# Patient Record
Sex: FEMALE | Race: WHITE | NOT HISPANIC OR LATINO | Employment: FULL TIME | URBAN - METROPOLITAN AREA
[De-identification: names, ages, dates, MRNs, and addresses within clinical notes are randomized per-mention and may not be internally consistent; named-entity substitution may affect disease eponyms.]

---

## 2023-09-07 PROBLEM — L30.4 INTERTRIGO: Status: ACTIVE | Noted: 2023-09-07

## 2023-09-07 PROBLEM — G56.00 CARPAL TUNNEL SYNDROME: Status: ACTIVE | Noted: 2023-09-07

## 2023-09-07 PROBLEM — E55.9 VITAMIN D DEFICIENCY: Status: ACTIVE | Noted: 2023-09-07

## 2023-09-07 PROBLEM — E11.65 HYPERGLYCEMIA DUE TO TYPE 2 DIABETES MELLITUS (MULTI): Status: ACTIVE | Noted: 2023-09-07

## 2023-09-07 PROBLEM — E66.9 OBESITY: Status: ACTIVE | Noted: 2023-09-07

## 2023-09-07 PROBLEM — K76.0 FATTY LIVER: Status: ACTIVE | Noted: 2023-09-07

## 2023-09-07 PROBLEM — I10 ESSENTIAL HYPERTENSION: Status: ACTIVE | Noted: 2023-09-07

## 2023-09-07 PROBLEM — K21.9 GASTROESOPHAGEAL REFLUX DISEASE WITHOUT ESOPHAGITIS: Status: ACTIVE | Noted: 2023-09-07

## 2023-09-07 PROBLEM — E03.9 HYPOTHYROIDISM: Status: ACTIVE | Noted: 2023-09-07

## 2023-09-07 PROBLEM — D50.9 IRON DEFICIENCY ANEMIA: Status: ACTIVE | Noted: 2023-09-07

## 2023-09-07 PROBLEM — M51.9 DISORDER OF INTERVERTEBRAL DISC OF LUMBAR SPINE: Status: ACTIVE | Noted: 2023-09-07

## 2023-09-07 PROBLEM — R32 URINARY INCONTINENCE: Status: ACTIVE | Noted: 2023-09-07

## 2023-09-07 PROBLEM — E78.2 MIXED HYPERLIPIDEMIA: Status: ACTIVE | Noted: 2023-09-07

## 2023-09-07 RX ORDER — TIRZEPATIDE 7.5 MG/.5ML
INJECTION, SOLUTION SUBCUTANEOUS
COMMUNITY
Start: 2023-01-19 | End: 2024-01-25

## 2023-09-07 RX ORDER — MULTIVIT-MIN/FA/LYCOPEN/LUTEIN .4-300-25
TABLET ORAL
COMMUNITY
End: 2024-03-28 | Stop reason: SDUPTHER

## 2023-09-07 RX ORDER — TIRZEPATIDE 5 MG/.5ML
INJECTION, SOLUTION SUBCUTANEOUS
COMMUNITY
Start: 2022-09-19 | End: 2024-01-25

## 2023-09-07 RX ORDER — PIOGLITAZONEHYDROCHLORIDE 15 MG/1
TABLET ORAL
COMMUNITY
End: 2024-01-25 | Stop reason: WASHOUT

## 2023-09-07 RX ORDER — CHOLECALCIFEROL (VITAMIN D3) 25 MCG
TABLET ORAL
COMMUNITY
End: 2024-03-28 | Stop reason: SDUPTHER

## 2023-09-07 RX ORDER — OMEPRAZOLE 20 MG/1
CAPSULE, DELAYED RELEASE ORAL
COMMUNITY
Start: 2020-02-28 | End: 2024-03-28 | Stop reason: SDUPTHER

## 2023-09-07 RX ORDER — OXYBUTYNIN CHLORIDE 10 MG/1
TABLET, EXTENDED RELEASE ORAL
COMMUNITY
End: 2024-02-07

## 2023-09-07 RX ORDER — METFORMIN HYDROCHLORIDE 1000 MG/1
TABLET ORAL
COMMUNITY
Start: 2020-02-10 | End: 2024-01-25 | Stop reason: SDUPTHER

## 2023-09-07 RX ORDER — NYSTATIN 100000 [USP'U]/G
POWDER TOPICAL
COMMUNITY
Start: 2020-02-17 | End: 2024-03-28 | Stop reason: ALTCHOICE

## 2023-09-07 RX ORDER — LANCETS
EACH MISCELLANEOUS
COMMUNITY
Start: 2010-08-17

## 2023-09-07 RX ORDER — GLIMEPIRIDE 2 MG/1
2 TABLET ORAL 2 TIMES DAILY
COMMUNITY
End: 2024-03-28 | Stop reason: SDUPTHER

## 2023-09-07 RX ORDER — METFORMIN HYDROCHLORIDE EXTENDED-RELEASE TABLETS 1000 MG/1
TABLET, FILM COATED, EXTENDED RELEASE ORAL
COMMUNITY
End: 2024-01-25

## 2023-09-07 RX ORDER — ATORVASTATIN CALCIUM 20 MG/1
TABLET, FILM COATED ORAL
COMMUNITY
Start: 2021-09-10 | End: 2024-02-07

## 2023-09-07 RX ORDER — LISINOPRIL 10 MG/1
TABLET ORAL
COMMUNITY
End: 2023-12-18

## 2023-09-20 ENCOUNTER — HOSPITAL ENCOUNTER (OUTPATIENT)
Dept: DATA CONVERSION | Facility: HOSPITAL | Age: 58
Discharge: HOME | End: 2023-09-20
Payer: MEDICARE

## 2023-09-20 DIAGNOSIS — E11.65 TYPE 2 DIABETES MELLITUS WITH HYPERGLYCEMIA (MULTI): ICD-10-CM

## 2023-09-20 DIAGNOSIS — E03.9 HYPOTHYROIDISM, UNSPECIFIED: ICD-10-CM

## 2023-09-20 DIAGNOSIS — Z01.89 ENCOUNTER FOR OTHER SPECIFIED SPECIAL EXAMINATIONS: ICD-10-CM

## 2023-09-20 DIAGNOSIS — E78.2 MIXED HYPERLIPIDEMIA: ICD-10-CM

## 2023-09-20 DIAGNOSIS — E55.9 VITAMIN D DEFICIENCY, UNSPECIFIED: ICD-10-CM

## 2023-09-20 DIAGNOSIS — D50.9 IRON DEFICIENCY ANEMIA, UNSPECIFIED: ICD-10-CM

## 2023-09-20 LAB
25(OH)D3 SERPL-MCNC: 36 NG/ML (ref 31–100)
ALBUMIN SERPL-MCNC: 4.5 GM/DL (ref 3.5–5)
ALBUMIN/GLOB SERPL: 1.8 RATIO (ref 1.5–3)
ALP BLD-CCNC: 82 U/L (ref 35–125)
ALT SERPL-CCNC: 18 U/L (ref 5–40)
ANION GAP SERPL CALCULATED.3IONS-SCNC: 15 MMOL/L (ref 0–19)
APPEARANCE PLAS: ABNORMAL
AST SERPL-CCNC: 18 U/L (ref 5–40)
BASOPHILS # BLD AUTO: 0.04 K/UL (ref 0–0.22)
BASOPHILS NFR BLD AUTO: 0.4 % (ref 0–1)
BILIRUB DIRECT SERPL-MCNC: 0.2 MG/DL (ref 0–0.2)
BILIRUB INDIRECT SERPL-MCNC: 0 MG/DL (ref 0–0.8)
BILIRUB SERPL-MCNC: 0.2 MG/DL (ref 0.1–1.2)
BUN SERPL-MCNC: 9 MG/DL (ref 8–25)
BUN/CREAT SERPL: 15 RATIO (ref 8–21)
CALCIUM SERPL-MCNC: 9.5 MG/DL (ref 8.5–10.4)
CHLORIDE SERPL-SCNC: 104 MMOL/L (ref 97–107)
CHOLEST SERPL-MCNC: 138 MG/DL (ref 133–200)
CHOLEST/HDLC SERPL: 2.5 RATIO
CO2 SERPL-SCNC: 22 MMOL/L (ref 24–31)
COLOR SPUN FLD: ABNORMAL
CREAT SERPL-MCNC: 0.6 MG/DL (ref 0.4–1.6)
CREAT UR-MCNC: 34.9 MG/DL
DEPRECATED RDW RBC AUTO: 46.2 FL (ref 37–54)
DIFFERENTIAL METHOD BLD: ABNORMAL
EOSINOPHIL # BLD AUTO: 0.61 K/UL (ref 0–0.45)
EOSINOPHIL NFR BLD: 6.3 % (ref 0–3)
ERYTHROCYTE [DISTWIDTH] IN BLOOD BY AUTOMATED COUNT: 14.4 % (ref 11.7–15)
FASTING STATUS PATIENT QL REPORTED: ABNORMAL
FERRITIN SERPL-MCNC: 42 NG/ML (ref 13–150)
GFR SERPL CREATININE-BSD FRML MDRD: 106 ML/MIN/1.73 M2
GLOBULIN SER-MCNC: 2.5 G/DL (ref 1.9–3.7)
GLUCOSE SERPL-MCNC: 143 MG/DL (ref 65–99)
HCT VFR BLD AUTO: 38 % (ref 36–44)
HDLC SERPL-MCNC: 55 MG/DL
HGB BLD-MCNC: 11.2 GM/DL (ref 12–15)
IMM GRANULOCYTES # BLD AUTO: 0.05 K/UL (ref 0–0.1)
IRON SATN MFR SERPL: 9.9 % (ref 12–50)
IRON SERPL-MCNC: 41 UG/DL (ref 30–160)
LDLC SERPL CALC-MCNC: 51 MG/DL (ref 65–130)
LYMPHOCYTES # BLD AUTO: 2.97 K/UL (ref 1.2–3.2)
LYMPHOCYTES NFR BLD MANUAL: 30.7 % (ref 20–40)
MCH RBC QN AUTO: 25.7 PG (ref 26–34)
MCHC RBC AUTO-ENTMCNC: 29.5 % (ref 31–37)
MCV RBC AUTO: 87.4 FL (ref 80–100)
MICROALBUMIN UR-MCNC: 12 MG/L (ref 0–23)
MICROALBUMIN/CREAT UR: 34.4 MG/G (ref 0–30)
MONOCYTES # BLD AUTO: 0.64 K/UL (ref 0–0.8)
MONOCYTES NFR BLD MANUAL: 6.6 % (ref 0–8)
NEUTROPHILS # BLD AUTO: 5.35 K/UL
NEUTROPHILS # BLD AUTO: 5.35 K/UL (ref 1.8–7.7)
NEUTROPHILS.IMMATURE NFR BLD: 0.5 % (ref 0–1)
NEUTS SEG NFR BLD: 55.5 % (ref 50–70)
NRBC BLD-RTO: 0 /100 WBC
PLATELET # BLD AUTO: 331 K/UL (ref 150–450)
PMV BLD AUTO: 9.3 CU (ref 7–12.6)
POTASSIUM SERPL-SCNC: 4.2 MMOL/L (ref 3.4–5.1)
PROT SERPL-MCNC: 7 G/DL (ref 5.9–7.9)
RBC # BLD AUTO: 4.35 M/UL (ref 4–4.9)
SODIUM SERPL-SCNC: 141 MMOL/L (ref 133–145)
TIBC SERPL-MCNC: 414 UG/DL (ref 228–428)
TRIGL SERPL-MCNC: 162 MG/DL (ref 40–150)
TSH SERPL DL<=0.05 MIU/L-ACNC: 2.58 MIU/L (ref 0.27–4.2)
WBC # BLD AUTO: 9.7 K/UL (ref 4.5–11)

## 2023-09-21 ENCOUNTER — HOSPITAL ENCOUNTER (OUTPATIENT)
Dept: DATA CONVERSION | Facility: HOSPITAL | Age: 58
Discharge: HOME | End: 2023-09-21
Payer: MEDICARE

## 2023-09-21 DIAGNOSIS — L94.3 SCLERODACTYLY: ICD-10-CM

## 2023-09-21 LAB — RHEUMATOID FACT SERPL-ACNC: 10 IU/ML (ref 0–20)

## 2023-09-22 LAB
ANA SER QL IA: NORMAL
CENTROMERE AB SER QL IF: NORMAL
ENA SCL70 IGG SER IA-ACNC: NORMAL
THYROPEROXIDASE AB SERPL-ACNC: NORMAL [IU]/ML

## 2023-12-06 ENCOUNTER — APPOINTMENT (OUTPATIENT)
Dept: RHEUMATOLOGY | Facility: CLINIC | Age: 58
End: 2023-12-06
Payer: MEDICARE

## 2023-12-13 ENCOUNTER — OFFICE VISIT (OUTPATIENT)
Dept: RHEUMATOLOGY | Facility: CLINIC | Age: 58
End: 2023-12-13
Payer: MEDICARE

## 2023-12-13 VITALS
HEART RATE: 80 BPM | SYSTOLIC BLOOD PRESSURE: 136 MMHG | WEIGHT: 185 LBS | DIASTOLIC BLOOD PRESSURE: 80 MMHG | BODY MASS INDEX: 34.04 KG/M2 | HEIGHT: 62 IN

## 2023-12-13 DIAGNOSIS — M65.331 TRIGGER MIDDLE FINGER OF RIGHT HAND: ICD-10-CM

## 2023-12-13 DIAGNOSIS — M65.332 TRIGGER MIDDLE FINGER OF LEFT HAND: Primary | ICD-10-CM

## 2023-12-13 PROCEDURE — 99204 OFFICE O/P NEW MOD 45 MIN: CPT | Performed by: INTERNAL MEDICINE

## 2023-12-13 PROCEDURE — 3075F SYST BP GE 130 - 139MM HG: CPT | Performed by: INTERNAL MEDICINE

## 2023-12-13 PROCEDURE — 1036F TOBACCO NON-USER: CPT | Performed by: INTERNAL MEDICINE

## 2023-12-13 PROCEDURE — 3079F DIAST BP 80-89 MM HG: CPT | Performed by: INTERNAL MEDICINE

## 2023-12-13 PROCEDURE — 4010F ACE/ARB THERAPY RXD/TAKEN: CPT | Performed by: INTERNAL MEDICINE

## 2023-12-13 RX ORDER — LOVASTATIN 20 MG/1
20 TABLET ORAL NIGHTLY
COMMUNITY
End: 2024-03-28 | Stop reason: ALTCHOICE

## 2023-12-13 ASSESSMENT — ENCOUNTER SYMPTOMS
WEAKNESS: 1
EYE PAIN: 0
FATIGUE: 0
SHORTNESS OF BREATH: 0
ABDOMINAL PAIN: 0

## 2023-12-13 NOTE — LETTER
December 13, 2023     Jerson Shahid MD  [address] [place]  [place] [address]    Patient: Sandra Bradshaw   YOB: 1965   Date of Visit: 12/13/2023       Dear Dr. Jerson Shahid MD:    Thank you for referring Sandra Bradshaw to me for evaluation. Below are my notes for this consultation.  If you have questions, please do not hesitate to call me. I look forward to following your patient along with you.       Sincerely,     Chelsea Jensen MD      CC: No Recipients  ______________________________________________________________________________________    Subjective  Patient ID: Sandra Bradshaw is a 58 y.o. female who presents for Joint Pain (New Patient~Referred by Dr. Jerson Shahid ).  HPI  Patient is here with complaints of bilateral hand pain especially in the middle finger bilaterally.  Both are getting stuck in place and has pain in the palm of bilateral hands.  She has a history of cervical spine surgery.  Also has a history of type 2 diabetes, hypertension, mixed hyperlipidemia and GERD.  Her provider felt that she had sclerodactyly and felt that she might have an autoimmune process.  She had blood work done in September that was negative for anti-SCL 70, anticentromere, rheumatoid factor, DELGADO, Antithyroid peroxidase antibody  She did not have the greatest control over her diabetes in 2022.  She did have a hemoglobin A1c greater than 10.  Most recently it is now 6.9.  Before she could barely touch the tip of her middle fingers and now she does not have as severe pain.  Still has difficulty fully straightening it out.    Social history is negative tobacco, alcohol, illicit drug use.  She is a caregiver to disabled people.    Family history includes diabetes.  Review of Systems   Constitutional:  Negative for fatigue.   Eyes:  Negative for pain.   Respiratory:  Negative for shortness of breath.    Cardiovascular:  Negative for chest pain.   Gastrointestinal:  Negative for abdominal pain.    Musculoskeletal:         Per HPI   Neurological:  Positive for weakness.       Objective  Physical Exam  GEN: NAD A&O x3 appropriate affectEYES:  no conjunctival redness, eyelids normal  SKIN: No psoriasis or nail pitting  PULSES: +radials  MSK:  Tenderness along the third flexor tendons bilaterally with nodule felt on palmar aspect.  Pain with movement of the third MCP but not of the PIP no synovitis in the DIP PIP MCP wrist elbows shoulders has general edema in her hands  Assessment/Plan       Patient with bilateral third digit trigger finger most likely associated with her history of diabetes.  Discussed about the association with this.  Reviewed her blood work which was all negative.  She does not have evidence of sclerodactyly.  Discussed conservative treatment such as bracing, stretches.  If these fail I would suggest that she see hand surgery and get perhaps an injection or release.    Can come back on an as-needed basis.    Chelsea Jensen MD 12/13/23 8:37 PM

## 2023-12-14 NOTE — PROGRESS NOTES
Subjective   Patient ID: Sandra Bradshaw is a 58 y.o. female who presents for Joint Pain (New Patient~Referred by Dr. Jerson Shahid ).  HPI  Patient is here with complaints of bilateral hand pain especially in the middle finger bilaterally.  Both are getting stuck in place and has pain in the palm of bilateral hands.  She has a history of cervical spine surgery.  Also has a history of type 2 diabetes, hypertension, mixed hyperlipidemia and GERD.  Her provider felt that she had sclerodactyly and felt that she might have an autoimmune process.  She had blood work done in September that was negative for anti-SCL 70, anticentromere, rheumatoid factor, DELGADO, Antithyroid peroxidase antibody  She did not have the greatest control over her diabetes in 2022.  She did have a hemoglobin A1c greater than 10.  Most recently it is now 6.9.  Before she could barely touch the tip of her middle fingers and now she does not have as severe pain.  Still has difficulty fully straightening it out.    Social history is negative tobacco, alcohol, illicit drug use.  She is a caregiver to disabled people.    Family history includes diabetes.  Review of Systems   Constitutional:  Negative for fatigue.   Eyes:  Negative for pain.   Respiratory:  Negative for shortness of breath.    Cardiovascular:  Negative for chest pain.   Gastrointestinal:  Negative for abdominal pain.   Musculoskeletal:         Per HPI   Neurological:  Positive for weakness.       Objective   Physical Exam  GEN: NAD A&O x3 appropriate affectEYES:  no conjunctival redness, eyelids normal  SKIN: No psoriasis or nail pitting  PULSES: +radials  MSK:  Tenderness along the third flexor tendons bilaterally with nodule felt on palmar aspect.  Pain with movement of the third MCP but not of the PIP no synovitis in the DIP PIP MCP wrist elbows shoulders has general edema in her hands  Assessment/Plan        Patient with bilateral third digit trigger finger most likely associated with  her history of diabetes.  Discussed about the association with this.  Reviewed her blood work which was all negative.  She does not have evidence of sclerodactyly.  Discussed conservative treatment such as bracing, stretches.  If these fail I would suggest that she see hand surgery and get perhaps an injection or release.    Can come back on an as-needed basis.    Chelsea Jensen MD 12/13/23 8:37 PM

## 2023-12-15 DIAGNOSIS — I10 ESSENTIAL HYPERTENSION: ICD-10-CM

## 2023-12-18 RX ORDER — LISINOPRIL 10 MG/1
10 TABLET ORAL DAILY
Qty: 90 TABLET | Refills: 3 | Status: SHIPPED | OUTPATIENT
Start: 2023-12-18 | End: 2024-03-28 | Stop reason: SDUPTHER

## 2024-01-24 NOTE — PROGRESS NOTES
HPI    59 yo with Diabetes 2 (dx 2007), HTN, Dyslipidemia, fatty liver, reflux. A1c-6.9% last visit, today 7.9%.     Pt is testing sugars 1 times per day. Pt is having low sugars 0 times/week. Pt's typical blood sugars are running 160-190's on waking, 120-140's at lunch, bedtime up to 140's . Pt is following a carb controlled diet and knows reasonable carb allowances. Pt is able to afford their medications. Pt is not all that active, physical at work.           Taking metformin 1gram bid, anuj 15mg, glimepiride 2mg bid (increased at last visit). Mounjaro and trulicity worked in the past,  issue is coverage and high deductible.           Taking atorvastatin 20mg for lipids and toleratine.           Taking lisinopril 10mg for htn.       Current Outpatient Medications:     atorvastatin (Lipitor) 20 mg tablet, 1 tablet Orally Once a day for 90 days, Disp: , Rfl:     blood sugar diagnostic strip, 1 x daily for 90 days, Disp: , Rfl:     cholecalciferol (Vitamin D-3) 25 MCG (1000 UT) tablet, Vitamin D 1000 UNIT TABS  Refills: 0     Active, Disp: , Rfl:     glimepiride (Amaryl) 2 mg tablet, Take 1 tablet (2 mg) by mouth 2 times a day., Disp: , Rfl:     lancets misc, as directed As directed for 90 days, Disp: , Rfl:     lisinopril 10 mg tablet, TAKE ONE TABLET BY MOUTH DAILY, Disp: 90 tablet, Rfl: 3    lovastatin (Mevacor) 20 mg tablet, Take 1 tablet (20 mg) by mouth once daily at bedtime., Disp: , Rfl:     metFORMIN (Glucophage) 1,000 mg tablet, 1 tablet with a meal Orally Twice a day for 90 days, Disp: , Rfl:     multivitamin with minerals iron-free (Centrum Silver), Centrum Silver Oral Tablet  Refills: 0     Active, Disp: , Rfl:     nystatin (Mycostatin) 100,000 unit/gram powder, apply to affected area twice daily as needed, Disp: , Rfl:     omeprazole (PriLOSEC) 20 mg DR capsule, 1 capsule Orally Once a day for 90 days, Disp: , Rfl:     oxybutynin XL (Ditropan-XL) 10 mg 24 hr tablet, Oxybutynin Chloride ER 10 MG Oral  "Tablet Extended Release 24 Hour  Refills: 0     Active, Disp: , Rfl:     pioglitazone (Actos) 15 mg tablet, TAKE ONE TABLET BY MOUTH EVERY DAY for 30, Disp: , Rfl:       Allergies as of 01/25/2024    (No Known Allergies)         Review of Systems   Cardiology: Lightheadedness-denies.  Chest pain-denies.  Leg edema-denies.  Palpitations-denies.  Respiratory: Cough-denies. Shortness of breath-denies.  Wheezing-denies.  Gastroenterology: Constipation-at times.  Diarrhea-at times.  Heartburn-denies.  Endocrinology: Cold intolerance-denies.  Heat intolerance-denies.  Sweats-denies.  Neurology: Headache-denies.  Tremor-denies.  Neuropathy in extremities +  Psychology: Low energy-denies.  Irritability-denies.  Sleep disturbances-denies.      /65 (BP Location: Left arm)   Pulse 74   Wt 84.6 kg (186 lb 9.6 oz)   BMI 34.13 kg/m²       Labs:  Lab Results   Component Value Date    WBC 9.7 09/20/2023    NRBC 0 09/20/2023    RBC 4.35 09/20/2023    HGB 11.2 (L) 09/20/2023    HCT 38.0 09/20/2023     09/20/2023     Lab Results   Component Value Date    CALCIUM 9.5 09/20/2023    AST 18 09/20/2023    ALKPHOS 82 09/20/2023    BILITOT 0.2 09/20/2023    PROT 7.0 09/20/2023    ALBUMIN 4.5 09/20/2023    GLOB 2.5 09/20/2023    AGR 1.8 09/20/2023     09/20/2023    K 4.2 09/20/2023     09/20/2023    CO2 22 (L) 09/20/2023    ANIONGAP 15 09/20/2023    BUN 9 09/20/2023    CREATININE 0.6 09/20/2023    UREACREAUR 15.0 09/20/2023    GLUCOSE 143 (H) 09/20/2023    ALT 18 09/20/2023    EGFR 106 09/20/2023     Lab Results   Component Value Date    CHOL 138 09/20/2023    TRIG 162 (H) 09/20/2023    HDL 55 09/20/2023    LDLCALC 51 (L) 09/20/2023     Lab Results   Component Value Date    MICROALBCREA 34.4 (H) 09/20/2023     Lab Results   Component Value Date    TSH 2.58 09/20/2023     No results found for: \"[id number]\"  Lab Results   Component Value Date    HGBA1C 7.9 (A) 01/25/2024         Physical Exam   General Appearance: " pleasant, cooperative, no acute distress  HEENT: no chemosis, no proptosis, no lid lag, no lid retraction  Neck: no lymphadenopathy, no thyromegaly, no dominant thyroid nodules  Heart: no murmurs, regular rate and rhythm, S1 and S2  Lungs: no wheezes, no rhonci, no rales  Extremities: no lower extremity swelling      Assessment/Plan   1. Type 2 diabetes mellitus with hyperglycemia, with long-term current use of insulin (CMS/[facility])  -A1c ordered and reviewed  -glycemic values reviewed  -lab tests reviewed    -sugars/A1c climbing, high deductible makes glp-1RA a bad option  -try glargine 10 units at bedtime, titrate 2 units q 3 days for am sugars 100-130 range  -taught pt how to do injection  -increase anuj from 15 to 30mg, watch for any swelling    2. Essential hypertension  -at target on therapy    3. Mixed hyperlipidemia  -on statin, ldl<70, labs reviewed, no change in therapy    4. Fatty Liver disease  -lifestyle/pioglitazone  -glp-1RA too costly with her insurance at this point      Follow Up:  jhony Bhakta D 4 months    Medical Decision Making  Complexity of problem: Chronic illness of diabetes mellitus uncontrolled, progressing  Data analyzed and reviewed: Reviewed prior notes, blood glucose data, labs including HgbA1c, lipids, serum chemistries.  Ordered tests.   Risk of complications and morbidities: Is definite because of use of insulin and risk of hypoglycemia.  Prescription medications reviewed and modifications made.  Compliance assessed.  Addressed social determinants of health including food insecurity.

## 2024-01-25 ENCOUNTER — OFFICE VISIT (OUTPATIENT)
Dept: ENDOCRINOLOGY | Facility: CLINIC | Age: 59
End: 2024-01-25
Payer: MEDICARE

## 2024-01-25 VITALS
BODY MASS INDEX: 34.13 KG/M2 | WEIGHT: 186.6 LBS | HEART RATE: 74 BPM | DIASTOLIC BLOOD PRESSURE: 65 MMHG | SYSTOLIC BLOOD PRESSURE: 129 MMHG

## 2024-01-25 DIAGNOSIS — I10 ESSENTIAL HYPERTENSION: ICD-10-CM

## 2024-01-25 DIAGNOSIS — K76.0 FATTY LIVER: ICD-10-CM

## 2024-01-25 DIAGNOSIS — E78.2 MIXED HYPERLIPIDEMIA: ICD-10-CM

## 2024-01-25 DIAGNOSIS — E11.65 TYPE 2 DIABETES MELLITUS WITH HYPERGLYCEMIA, WITH LONG-TERM CURRENT USE OF INSULIN (MULTI): Primary | ICD-10-CM

## 2024-01-25 DIAGNOSIS — Z79.4 TYPE 2 DIABETES MELLITUS WITH HYPERGLYCEMIA, WITH LONG-TERM CURRENT USE OF INSULIN (MULTI): Primary | ICD-10-CM

## 2024-01-25 LAB — POC HEMOGLOBIN A1C: 7.9 % (ref 4.2–6.5)

## 2024-01-25 PROCEDURE — 99214 OFFICE O/P EST MOD 30 MIN: CPT | Performed by: INTERNAL MEDICINE

## 2024-01-25 PROCEDURE — 1036F TOBACCO NON-USER: CPT | Performed by: INTERNAL MEDICINE

## 2024-01-25 PROCEDURE — 4010F ACE/ARB THERAPY RXD/TAKEN: CPT | Performed by: INTERNAL MEDICINE

## 2024-01-25 PROCEDURE — 3074F SYST BP LT 130 MM HG: CPT | Performed by: INTERNAL MEDICINE

## 2024-01-25 PROCEDURE — 83036 HEMOGLOBIN GLYCOSYLATED A1C: CPT | Performed by: INTERNAL MEDICINE

## 2024-01-25 PROCEDURE — 3078F DIAST BP <80 MM HG: CPT | Performed by: INTERNAL MEDICINE

## 2024-01-25 RX ORDER — PEN NEEDLE, DIABETIC 30 GX3/16"
NEEDLE, DISPOSABLE MISCELLANEOUS
Qty: 50 EACH | Refills: 6 | Status: SHIPPED | OUTPATIENT
Start: 2024-01-25 | End: 2024-05-23 | Stop reason: WASHOUT

## 2024-01-25 RX ORDER — METFORMIN HYDROCHLORIDE 1000 MG/1
TABLET ORAL
Qty: 60 TABLET | Refills: 6 | Status: SHIPPED | OUTPATIENT
Start: 2024-01-25 | End: 2024-03-28 | Stop reason: SDUPTHER

## 2024-01-25 RX ORDER — INSULIN GLARGINE 100 [IU]/ML
INJECTION, SOLUTION SUBCUTANEOUS
Qty: 15 ML | Refills: 6 | Status: SHIPPED | OUTPATIENT
Start: 2024-01-25 | End: 2024-05-23 | Stop reason: WASHOUT

## 2024-01-25 RX ORDER — PIOGLITAZONEHYDROCHLORIDE 30 MG/1
30 TABLET ORAL DAILY
Qty: 30 TABLET | Refills: 6 | Status: SHIPPED | OUTPATIENT
Start: 2024-01-25 | End: 2024-03-28 | Stop reason: SDUPTHER

## 2024-01-25 ASSESSMENT — PAIN SCALES - GENERAL: PAINLEVEL: 0-NO PAIN

## 2024-01-25 ASSESSMENT — ENCOUNTER SYMPTOMS
DEPRESSION: 0
OCCASIONAL FEELINGS OF UNSTEADINESS: 0
LOSS OF SENSATION IN FEET: 0

## 2024-01-25 ASSESSMENT — PATIENT HEALTH QUESTIONNAIRE - PHQ9
SUM OF ALL RESPONSES TO PHQ9 QUESTIONS 1 & 2: 0
1. LITTLE INTEREST OR PLEASURE IN DOING THINGS: NOT AT ALL
2. FEELING DOWN, DEPRESSED OR HOPELESS: NOT AT ALL

## 2024-01-29 ENCOUNTER — TELEPHONE (OUTPATIENT)
Dept: ENDOCRINOLOGY | Facility: CLINIC | Age: 59
End: 2024-01-29
Payer: MEDICARE

## 2024-01-29 NOTE — TELEPHONE ENCOUNTER
Sandra CANNON [place]   1965   [id number]   [phone]       Patient called and mentioned that insurance will cover Basaglar pen and she can not afford cost. However, wanted to know if she go back on Trulicity or go back diet without medication. Message sent to provider.

## 2024-02-07 DIAGNOSIS — R32 URINARY INCONTINENCE, UNSPECIFIED TYPE: Primary | ICD-10-CM

## 2024-02-07 DIAGNOSIS — E78.2 MIXED HYPERLIPIDEMIA: ICD-10-CM

## 2024-02-07 RX ORDER — ATORVASTATIN CALCIUM 20 MG/1
20 TABLET, FILM COATED ORAL DAILY
Qty: 90 TABLET | Refills: 3 | Status: SHIPPED | OUTPATIENT
Start: 2024-02-07 | End: 2024-03-28 | Stop reason: SDUPTHER

## 2024-02-07 RX ORDER — OXYBUTYNIN CHLORIDE 10 MG/1
10 TABLET, EXTENDED RELEASE ORAL DAILY
Qty: 90 TABLET | Refills: 3 | Status: SHIPPED | OUTPATIENT
Start: 2024-02-07 | End: 2024-03-28 | Stop reason: SDUPTHER

## 2024-03-20 ENCOUNTER — LAB (OUTPATIENT)
Dept: LAB | Facility: LAB | Age: 59
End: 2024-03-20
Payer: MEDICARE

## 2024-03-20 DIAGNOSIS — Z01.89 ENCOUNTER FOR OTHER SPECIFIED SPECIAL EXAMINATIONS: Primary | ICD-10-CM

## 2024-03-20 LAB
ALBUMIN SERPL BCP-MCNC: 4.5 G/DL (ref 3.4–5)
ALP SERPL-CCNC: 64 U/L (ref 33–110)
ALT SERPL W P-5'-P-CCNC: 16 U/L (ref 7–45)
ANION GAP SERPL CALC-SCNC: 12 MMOL/L (ref 10–20)
AST SERPL W P-5'-P-CCNC: 16 U/L (ref 9–39)
BASOPHILS # BLD AUTO: 0.04 X10*3/UL (ref 0–0.1)
BASOPHILS NFR BLD AUTO: 0.5 %
BILIRUB DIRECT SERPL-MCNC: 0.1 MG/DL (ref 0–0.3)
BILIRUB SERPL-MCNC: 0.4 MG/DL (ref 0–1.2)
BUN SERPL-MCNC: 12 MG/DL (ref 6–23)
CALCIUM SERPL-MCNC: 9.6 MG/DL (ref 8.6–10.3)
CHLORIDE SERPL-SCNC: 102 MMOL/L (ref 98–107)
CO2 SERPL-SCNC: 28 MMOL/L (ref 21–32)
CREAT SERPL-MCNC: 0.57 MG/DL (ref 0.5–1.05)
EGFRCR SERPLBLD CKD-EPI 2021: >90 ML/MIN/1.73M*2
EOSINOPHIL # BLD AUTO: 0.42 X10*3/UL (ref 0–0.7)
EOSINOPHIL NFR BLD AUTO: 5.2 %
ERYTHROCYTE [DISTWIDTH] IN BLOOD BY AUTOMATED COUNT: 14.5 % (ref 11.5–14.5)
GLUCOSE SERPL-MCNC: 130 MG/DL (ref 74–99)
HCT VFR BLD AUTO: 36.6 % (ref 36–46)
HGB BLD-MCNC: 11.3 G/DL (ref 12–16)
IMM GRANULOCYTES # BLD AUTO: 0.03 X10*3/UL (ref 0–0.7)
IMM GRANULOCYTES NFR BLD AUTO: 0.4 % (ref 0–0.9)
LYMPHOCYTES # BLD AUTO: 2.58 X10*3/UL (ref 1.2–4.8)
LYMPHOCYTES NFR BLD AUTO: 32.2 %
MCH RBC QN AUTO: 25.6 PG (ref 26–34)
MCHC RBC AUTO-ENTMCNC: 30.9 G/DL (ref 32–36)
MCV RBC AUTO: 83 FL (ref 80–100)
MONOCYTES # BLD AUTO: 0.59 X10*3/UL (ref 0.1–1)
MONOCYTES NFR BLD AUTO: 7.4 %
NEUTROPHILS # BLD AUTO: 4.36 X10*3/UL (ref 1.2–7.7)
NEUTROPHILS NFR BLD AUTO: 54.3 %
NRBC BLD-RTO: 0 /100 WBCS (ref 0–0)
PLATELET # BLD AUTO: 338 X10*3/UL (ref 150–450)
POTASSIUM SERPL-SCNC: 3.9 MMOL/L (ref 3.5–5.3)
PROT SERPL-MCNC: 7.7 G/DL (ref 6.4–8.2)
RBC # BLD AUTO: 4.41 X10*6/UL (ref 4–5.2)
SODIUM SERPL-SCNC: 138 MMOL/L (ref 136–145)
WBC # BLD AUTO: 8 X10*3/UL (ref 4.4–11.3)

## 2024-03-20 PROCEDURE — 36415 COLL VENOUS BLD VENIPUNCTURE: CPT

## 2024-03-27 PROBLEM — E11.9 TYPE 2 DIABETES MELLITUS (MULTI): Status: ACTIVE | Noted: 2023-09-07

## 2024-03-27 PROBLEM — E78.5 HLD (HYPERLIPIDEMIA): Status: ACTIVE | Noted: 2023-09-07

## 2024-03-27 PROBLEM — D64.9 ANEMIA: Status: ACTIVE | Noted: 2023-09-07

## 2024-03-27 NOTE — PROGRESS NOTES
[facility]: [facility]  PROGRESS NOTE      Sandra Bradshaw is a 58 y.o. female that is presenting today for Follow-up (6 month ).    Assessment/Plan   Diagnoses and all orders for this visit:  Class 1 obesity with serious comorbidity and body mass index (BMI) of 32.0 to 32.9 in adult, unspecified obesity type  Type 2 diabetes mellitus with other specified complication, with long-term current use of insulin (CMS/[facility])  -     POCT glycosylated hemoglobin (Hb A1C) manually resulted  -     metFORMIN (Glucophage) 1,000 mg tablet; Take 1 tablet (1,000 mg) by mouth 2 times a day with meals.  -     multivitamin with minerals iron-free (Centrum Silver); Take 1 tablet by mouth once daily.  -     glimepiride (Amaryl) 2 mg tablet; Take 1 tablet (2 mg) by mouth 2 times a day before meals.  -     pioglitazone (Actos) 30 mg tablet; Take 1 tablet (30 mg) by mouth once daily.  -     Hemoglobin A1C; Future  -     TSH with reflex to Free T4 if abnormal; Future  -     Albumin , Urine Random; Future  Gastroesophageal reflux disease without esophagitis  -     omeprazole (PriLOSEC) 20 mg DR capsule; Take 1 capsule (20 mg) by mouth once daily. Do not crush or chew.  Primary osteoarthritis involving multiple joints  -     Referral to Orthopaedic Surgery; Future  NAFLD (nonalcoholic fatty liver disease)  -     Hepatic Function Panel; Future  Urinary incontinence, unspecified type  -     oxybutynin XL (Ditropan-XL) 10 mg 24 hr tablet; Take 1 tablet (10 mg) by mouth once daily.  Trigger middle finger of left hand  -     Referral to Orthopaedic Surgery; Future  Anemia, unspecified type  -     CBC and Auto Differential; Future  -     Iron and TIBC; Future  -     Ferritin; Future  -     Folate; Future  -     Vitamin B12; Future  Mixed hyperlipidemia  -     atorvastatin (Lipitor) 20 mg tablet; Take 1 tablet (20 mg) by mouth once daily.  -     Lipid Panel; Future  Primary hypertension  -     lisinopril 10 mg tablet; Take 1  tablet (10 mg) by mouth once daily.  -     Basic Metabolic Panel; Future  Vitamin D deficiency  -     cholecalciferol (Vitamin D-3) 25 MCG (1000 UT) tablet; Take 1 tablet (1,000 Units) by mouth once daily.  -     multivitamin with minerals iron-free (Centrum Silver); Take 1 tablet by mouth once daily.  -     Vitamin D 25-Hydroxy,Total (for eval of Vitamin D levels); Future  Encounter for routine laboratory testing  -     CBC and Auto Differential; Future  -     Basic Metabolic Panel; Future  -     Hepatic Function Panel; Future  -     Lipid Panel; Future  -     Hemoglobin A1C; Future  -     Vitamin D 25-Hydroxy,Total (for eval of Vitamin D levels); Future  -     TSH with reflex to Free T4 if abnormal; Future  -     Albumin , Urine Random; Future  -     Iron and TIBC; Future  -     Ferritin; Future  -     Folate; Future  -     Vitamin B12; Future  -     Follow Up In Primary Care; Future  Encounter for screening mammogram for breast cancer  -     BI mammo bilateral screening tomosynthesis; Future    - Significant medication and problem list reconciliation done today.  - Encouraged continued diet and exercise modification.   - Patient's blood sugars are managed by endocrinology. Per patient, we attempted to do basal insulin at her last appointment; however, this ended up not happening for a couple of reasons:  - Patient went to the pharmacy and it was too expensive.  - Patient's numbers improved with the increased dosing of the pioglitazone.   - I will not interfere. Continue management through endocrinology.  - Patient's blood pressure looks great. No changes at this time.  - Patient had labwork done for this appointment. Discussed today. Everything looked great. Will order labwork for the patient's next appointment.  - Patient's mental health good. No changes.  - Patient otherwise feels well. No changes at this time.    Subjective   - The patient otherwise feels well and denies any acute symptoms or concerns at this  time.  - The patient denies any changes or progression of their chronic medical problems.  - The patient denies any problems or concerns with their medications.      Review of Systems   Constitutional: Negative.    Respiratory: Negative.     Cardiovascular: Negative.    Gastrointestinal: Negative.    All other systems reviewed and are negative.     Objective   Vitals:    03/28/24 0905   BP: 122/80   Pulse: 84   Temp: 36.7 °C (98 °F)   SpO2: 97%      Body mass index is 32.37 kg/m².  Physical Exam  Vitals and nursing note reviewed.   Constitutional:       General: She is not in acute distress.  Neck:      Vascular: No carotid bruit.   Cardiovascular:      Rate and Rhythm: Normal rate and regular rhythm.      Heart sounds: Normal heart sounds.   Pulmonary:      Effort: Pulmonary effort is normal.      Breath sounds: Normal breath sounds.   Musculoskeletal:         General: No swelling.   Neurological:      Mental Status: She is alert. Mental status is at baseline.   Psychiatric:         Mood and Affect: Mood normal.       Diagnostic Results   Lab Results   Component Value Date    GLUCOSE 130 (H) 03/20/2024    CALCIUM 9.6 03/20/2024     03/20/2024    K 3.9 03/20/2024    CO2 28 03/20/2024     03/20/2024    BUN 12 03/20/2024    CREATININE 0.57 03/20/2024     Lab Results   Component Value Date    ALT 16 03/20/2024    AST 16 03/20/2024    ALKPHOS 64 03/20/2024    BILITOT 0.4 03/20/2024     Lab Results   Component Value Date    WBC 8.0 03/20/2024    HGB 11.3 (L) 03/20/2024    HCT 36.6 03/20/2024    MCV 83 03/20/2024     03/20/2024     Lab Results   Component Value Date    CHOL 138 09/20/2023    CHOL 122 (L) 03/17/2022    CHOL 138 10/26/2021     Lab Results   Component Value Date    HDL 55 09/20/2023    HDL 39 (L) 03/17/2022    HDL 42 (L) 10/26/2021     Lab Results   Component Value Date    LDLCALC 51 (L) 09/20/2023    LDLCALC 56 (L) 03/17/2022    LDLCALC 58 (L) 10/26/2021     Lab Results   Component Value  "Date    TRIG 162 (H) 2023    TRIG 135 2022    TRIG 191 (H) 10/26/2021     No components found for: \"CHOLHDL\"  Lab Results   Component Value Date    HGBA1C 7.9 (A) 2024     Other labs not included in the list above were reviewed either before or during this encounter.    History    Past Medical History:   Diagnosis Date    Mixed hyperlipidemia     Personal history of other diseases of the circulatory system     History of pulmonary hypertension    Personal history of other specified conditions     History of urinary frequency    Type 2 diabetes mellitus without complications (CMS/HCC)     Type 2 diabetes mellitus without complication     Past Surgical History:   Procedure Laterality Date    CERVICAL DISC SURGERY      OTHER SURGICAL HISTORY  2020     section    OTHER SURGICAL HISTORY  2020    Cholecystectomy    OTHER SURGICAL HISTORY  2008    corpectomy,partial C5-6    TUBAL LIGATION       Family History   Problem Relation Name Age of Onset    Diabetes Mother      Heart disease Mother      Diabetes Father      Hypertension Father      Stroke Father      Stomach cancer Paternal Grandmother       Social History     Socioeconomic History    Marital status:      Spouse name: Not on file    Number of children: Not on file    Years of education: Not on file    Highest education level: Not on file   Occupational History    Not on file   Tobacco Use    Smoking status: Never     Passive exposure: Never    Smokeless tobacco: Never   Vaping Use    Vaping Use: Never used   Substance and Sexual Activity    Alcohol use: Yes     Comment: occasionally    Drug use: Never    Sexual activity: Defer   Other Topics Concern    Not on file   Social History Narrative    Not on file     Social Determinants of Health     Financial Resource Strain: Not on file   Food Insecurity: Not on file   Transportation Needs: Not on file   Physical Activity: Not on file   Stress: Not on file   Social " "Connections: Not on file   Intimate Partner Violence: Not on file   Housing Stability: Not on file     No Known Allergies  Current Outpatient Medications on File Prior to Visit   Medication Sig Dispense Refill    blood sugar diagnostic strip 1 x daily for 90 days      lancets misc as directed As directed for 90 days      pen needle, diabetic 32 gauge x 5/32\" needle Use daily 50 each 6    [DISCONTINUED] atorvastatin (Lipitor) 20 mg tablet TAKE ONE TABLET BY MOUTH ONCE A DAY 90 tablet 3    [DISCONTINUED] cholecalciferol (Vitamin D-3) 25 MCG (1000 UT) tablet Vitamin D 1000 UNIT TABS   Refills: 0       Active      [DISCONTINUED] glimepiride (Amaryl) 2 mg tablet Take 1 tablet (2 mg) by mouth 2 times a day.      [DISCONTINUED] lisinopril 10 mg tablet TAKE ONE TABLET BY MOUTH DAILY 90 tablet 3    [DISCONTINUED] metFORMIN (Glucophage) 1,000 mg tablet 1 tablet with a meal Orally Twice a day for 90 days 60 tablet 6    [DISCONTINUED] multivitamin with minerals iron-free (Centrum Silver) Centrum Silver Oral Tablet   Refills: 0       Active      [DISCONTINUED] omeprazole (PriLOSEC) 20 mg DR capsule 1 capsule Orally Once a day for 90 days      [DISCONTINUED] oxybutynin XL (Ditropan-XL) 10 mg 24 hr tablet TAKE ONE TABLET BY MOUTH DAILY (Patient taking differently: Take 5 mg by mouth once daily.) 90 tablet 3    [DISCONTINUED] pioglitazone (Actos) 30 mg tablet Take 1 tablet (30 mg) by mouth once daily. 30 tablet 6    Basaglar KwikPen U-100 Insulin 100 unit/mL (3 mL) pen -start 10 units at bedtime, up to 30 units daily as directed (Patient not taking: Reported on 3/28/2024) 15 mL 6    [DISCONTINUED] lovastatin (Mevacor) 20 mg tablet Take 1 tablet (20 mg) by mouth once daily at bedtime.      [DISCONTINUED] nystatin (Mycostatin) 100,000 unit/gram powder apply to affected area twice daily as needed       No current facility-administered medications on file prior to visit.     Immunization History   Administered Date(s) Administered    " Flu vaccine (IIV4), preservative free *Check age/dose* 10/01/2020    Flu vaccine, quadrivalent, no egg protein, age 6 month or greater (FLUCELVAX) 12/28/2021, 09/21/2022, 10/01/2023    Influenza, Unspecified 09/21/2022    Influenza, injectable, quadrivalent 10/09/2015, 12/30/2016, 10/27/2017, 10/23/2018, 11/06/2019    Influenza, seasonal, injectable 11/30/2010, 10/22/2013, 09/19/2014    Novel influenza-H1N1-09, preservative-free 12/13/2009    PPD Test 10/22/2013, 10/27/2017, 10/23/2018    Pfizer COVID-19 vaccine, Fall 2023, 12 years and older, (30mcg/0.3mL) 10/01/2023    Pfizer Purple Cap SARS-CoV-2 01/23/2021, 02/13/2021, 11/27/2021    Tdap vaccine, age 7 year and older (BOOSTRIX, ADACEL) 09/21/2023    Zoster vaccine, recombinant, adult (SHINGRIX) 08/21/2021, 04/18/2022     Patient's medical history was reviewed and updated either before or during this encounter.       Jerson Shahid MD

## 2024-03-28 ENCOUNTER — OFFICE VISIT (OUTPATIENT)
Dept: PRIMARY CARE | Facility: CLINIC | Age: 59
End: 2024-03-28
Payer: MEDICARE

## 2024-03-28 VITALS
HEART RATE: 84 BPM | OXYGEN SATURATION: 97 % | SYSTOLIC BLOOD PRESSURE: 122 MMHG | BODY MASS INDEX: 32.57 KG/M2 | HEIGHT: 62 IN | DIASTOLIC BLOOD PRESSURE: 80 MMHG | WEIGHT: 177 LBS | TEMPERATURE: 98 F

## 2024-03-28 DIAGNOSIS — Z79.4 TYPE 2 DIABETES MELLITUS WITH OTHER SPECIFIED COMPLICATION, WITH LONG-TERM CURRENT USE OF INSULIN (MULTI): ICD-10-CM

## 2024-03-28 DIAGNOSIS — E55.9 VITAMIN D DEFICIENCY: ICD-10-CM

## 2024-03-28 DIAGNOSIS — E66.9 CLASS 1 OBESITY WITH SERIOUS COMORBIDITY AND BODY MASS INDEX (BMI) OF 32.0 TO 32.9 IN ADULT, UNSPECIFIED OBESITY TYPE: Primary | ICD-10-CM

## 2024-03-28 DIAGNOSIS — R32 URINARY INCONTINENCE, UNSPECIFIED TYPE: ICD-10-CM

## 2024-03-28 DIAGNOSIS — K21.9 GASTROESOPHAGEAL REFLUX DISEASE WITHOUT ESOPHAGITIS: ICD-10-CM

## 2024-03-28 DIAGNOSIS — M15.9 PRIMARY OSTEOARTHRITIS INVOLVING MULTIPLE JOINTS: ICD-10-CM

## 2024-03-28 DIAGNOSIS — E78.2 MIXED HYPERLIPIDEMIA: ICD-10-CM

## 2024-03-28 DIAGNOSIS — Z01.89 ENCOUNTER FOR ROUTINE LABORATORY TESTING: ICD-10-CM

## 2024-03-28 DIAGNOSIS — E11.69 TYPE 2 DIABETES MELLITUS WITH OTHER SPECIFIED COMPLICATION, WITH LONG-TERM CURRENT USE OF INSULIN (MULTI): ICD-10-CM

## 2024-03-28 DIAGNOSIS — K76.0 NAFLD (NONALCOHOLIC FATTY LIVER DISEASE): ICD-10-CM

## 2024-03-28 DIAGNOSIS — M65.332 TRIGGER MIDDLE FINGER OF LEFT HAND: ICD-10-CM

## 2024-03-28 DIAGNOSIS — Z12.31 ENCOUNTER FOR SCREENING MAMMOGRAM FOR BREAST CANCER: ICD-10-CM

## 2024-03-28 DIAGNOSIS — I10 PRIMARY HYPERTENSION: ICD-10-CM

## 2024-03-28 DIAGNOSIS — D64.9 ANEMIA, UNSPECIFIED TYPE: ICD-10-CM

## 2024-03-28 PROBLEM — M65.30 TRIGGER FINGER: Status: ACTIVE | Noted: 2024-03-28

## 2024-03-28 PROBLEM — M19.90 OA (OSTEOARTHRITIS): Status: ACTIVE | Noted: 2024-03-28

## 2024-03-28 PROCEDURE — 99214 OFFICE O/P EST MOD 30 MIN: CPT | Performed by: STUDENT IN AN ORGANIZED HEALTH CARE EDUCATION/TRAINING PROGRAM

## 2024-03-28 PROCEDURE — 3008F BODY MASS INDEX DOCD: CPT | Performed by: STUDENT IN AN ORGANIZED HEALTH CARE EDUCATION/TRAINING PROGRAM

## 2024-03-28 PROCEDURE — 3074F SYST BP LT 130 MM HG: CPT | Performed by: STUDENT IN AN ORGANIZED HEALTH CARE EDUCATION/TRAINING PROGRAM

## 2024-03-28 PROCEDURE — 1036F TOBACCO NON-USER: CPT | Performed by: STUDENT IN AN ORGANIZED HEALTH CARE EDUCATION/TRAINING PROGRAM

## 2024-03-28 PROCEDURE — 4010F ACE/ARB THERAPY RXD/TAKEN: CPT | Performed by: STUDENT IN AN ORGANIZED HEALTH CARE EDUCATION/TRAINING PROGRAM

## 2024-03-28 PROCEDURE — 3079F DIAST BP 80-89 MM HG: CPT | Performed by: STUDENT IN AN ORGANIZED HEALTH CARE EDUCATION/TRAINING PROGRAM

## 2024-03-28 RX ORDER — OMEPRAZOLE 20 MG/1
20 CAPSULE, DELAYED RELEASE ORAL DAILY
Qty: 90 CAPSULE | Refills: 3 | Status: SHIPPED | OUTPATIENT
Start: 2024-03-28 | End: 2025-03-28

## 2024-03-28 RX ORDER — MULTIVIT-MIN/FA/LYCOPEN/LUTEIN .4-300-25
1 TABLET ORAL DAILY
Start: 2024-03-28

## 2024-03-28 RX ORDER — PIOGLITAZONEHYDROCHLORIDE 30 MG/1
30 TABLET ORAL DAILY
Qty: 90 TABLET | Refills: 3 | Status: SHIPPED | OUTPATIENT
Start: 2024-03-28 | End: 2025-03-28

## 2024-03-28 RX ORDER — METFORMIN HYDROCHLORIDE 1000 MG/1
1000 TABLET ORAL
Qty: 180 TABLET | Refills: 3 | Status: SHIPPED | OUTPATIENT
Start: 2024-03-28 | End: 2025-03-28

## 2024-03-28 RX ORDER — GLIMEPIRIDE 2 MG/1
2 TABLET ORAL
Qty: 180 TABLET | Refills: 3 | Status: SHIPPED | OUTPATIENT
Start: 2024-03-28 | End: 2025-03-28

## 2024-03-28 RX ORDER — ATORVASTATIN CALCIUM 20 MG/1
20 TABLET, FILM COATED ORAL DAILY
Qty: 90 TABLET | Refills: 3 | Status: SHIPPED | OUTPATIENT
Start: 2024-03-28

## 2024-03-28 RX ORDER — OXYBUTYNIN CHLORIDE 10 MG/1
10 TABLET, EXTENDED RELEASE ORAL DAILY
Qty: 90 TABLET | Refills: 3 | Status: SHIPPED | OUTPATIENT
Start: 2024-03-28 | End: 2025-03-28

## 2024-03-28 RX ORDER — LISINOPRIL 10 MG/1
10 TABLET ORAL DAILY
Qty: 90 TABLET | Refills: 3 | Status: SHIPPED | OUTPATIENT
Start: 2024-03-28 | End: 2025-03-28

## 2024-03-28 RX ORDER — CHOLECALCIFEROL (VITAMIN D3) 25 MCG
1000 TABLET ORAL DAILY
Start: 2024-03-28

## 2024-03-28 ASSESSMENT — PATIENT HEALTH QUESTIONNAIRE - PHQ9
SUM OF ALL RESPONSES TO PHQ9 QUESTIONS 1 AND 2: 0
2. FEELING DOWN, DEPRESSED OR HOPELESS: NOT AT ALL
1. LITTLE INTEREST OR PLEASURE IN DOING THINGS: NOT AT ALL

## 2024-03-28 ASSESSMENT — ENCOUNTER SYMPTOMS
GASTROINTESTINAL NEGATIVE: 1
CONSTITUTIONAL NEGATIVE: 1
CARDIOVASCULAR NEGATIVE: 1
RESPIRATORY NEGATIVE: 1

## 2024-03-28 ASSESSMENT — PAIN SCALES - GENERAL: PAINLEVEL: 0-NO PAIN

## 2024-03-28 NOTE — PATIENT INSTRUCTIONS
Diagnoses and all orders for this visit:  Class 1 obesity with serious comorbidity and body mass index (BMI) of 32.0 to 32.9 in adult, unspecified obesity type  Type 2 diabetes mellitus with other specified complication, with long-term current use of insulin (CMS/[facility])  -     POCT glycosylated hemoglobin (Hb A1C) manually resulted  -     metFORMIN (Glucophage) 1,000 mg tablet; Take 1 tablet (1,000 mg) by mouth 2 times a day with meals.  -     multivitamin with minerals iron-free (Centrum Silver); Take 1 tablet by mouth once daily.  -     glimepiride (Amaryl) 2 mg tablet; Take 1 tablet (2 mg) by mouth 2 times a day before meals.  -     pioglitazone (Actos) 30 mg tablet; Take 1 tablet (30 mg) by mouth once daily.  -     Hemoglobin A1C; Future  -     TSH with reflex to Free T4 if abnormal; Future  -     Albumin , Urine Random; Future  Gastroesophageal reflux disease without esophagitis  -     omeprazole (PriLOSEC) 20 mg DR capsule; Take 1 capsule (20 mg) by mouth once daily. Do not crush or chew.  Primary osteoarthritis involving multiple joints  -     Referral to Orthopaedic Surgery; Future  NAFLD (nonalcoholic fatty liver disease)  -     Hepatic Function Panel; Future  Urinary incontinence, unspecified type  -     oxybutynin XL (Ditropan-XL) 10 mg 24 hr tablet; Take 1 tablet (10 mg) by mouth once daily.  Trigger middle finger of left hand  -     Referral to Orthopaedic Surgery; Future  Anemia, unspecified type  -     CBC and Auto Differential; Future  -     Iron and TIBC; Future  -     Ferritin; Future  -     Folate; Future  -     Vitamin B12; Future  Mixed hyperlipidemia  -     atorvastatin (Lipitor) 20 mg tablet; Take 1 tablet (20 mg) by mouth once daily.  -     Lipid Panel; Future  Primary hypertension  -     lisinopril 10 mg tablet; Take 1 tablet (10 mg) by mouth once daily.  -     Basic Metabolic Panel; Future  Vitamin D deficiency  -     cholecalciferol (Vitamin D-3) 25 MCG (1000 UT) tablet; Take 1 tablet (1,000  Units) by mouth once daily.  -     multivitamin with minerals iron-free (Centrum Silver); Take 1 tablet by mouth once daily.  -     Vitamin D 25-Hydroxy,Total (for eval of Vitamin D levels); Future  Encounter for routine laboratory testing  -     CBC and Auto Differential; Future  -     Basic Metabolic Panel; Future  -     Hepatic Function Panel; Future  -     Lipid Panel; Future  -     Hemoglobin A1C; Future  -     Vitamin D 25-Hydroxy,Total (for eval of Vitamin D levels); Future  -     TSH with reflex to Free T4 if abnormal; Future  -     Albumin , Urine Random; Future  -     Iron and TIBC; Future  -     Ferritin; Future  -     Folate; Future  -     Vitamin B12; Future  -     Follow Up In Primary Care; Future  Encounter for screening mammogram for breast cancer  -     BI mammo bilateral screening tomosynthesis; Future     - Significant medication and problem list reconciliation done today.  - Encouraged continued diet and exercise modification.   - Patient's blood sugars are managed by endocrinology. Per patient, we attempted to do basal insulin at her last appointment; however, this ended up not happening for a couple of reasons:  - Patient went to the pharmacy and it was too expensive.  - Patient's numbers improved with the increased dosing of the pioglitazone.   - I will not interfere. Continue management through endocrinology.  - Patient's blood pressure looks great. No changes at this time.  - Patient had labwork done for this appointment. Discussed today. Everything looked great. Will order labwork for the patient's next appointment.  - Patient's mental health good. No changes.  - Patient otherwise feels well. No changes at this time.

## 2024-04-22 ENCOUNTER — HOSPITAL ENCOUNTER (OUTPATIENT)
Dept: RADIOLOGY | Facility: CLINIC | Age: 59
Discharge: HOME | End: 2024-04-22
Payer: MEDICARE

## 2024-04-22 ENCOUNTER — OFFICE VISIT (OUTPATIENT)
Dept: ORTHOPEDIC SURGERY | Facility: CLINIC | Age: 59
End: 2024-04-22
Payer: MEDICARE

## 2024-04-22 DIAGNOSIS — E11.69 TYPE 2 DIABETES MELLITUS WITH OTHER SPECIFIED COMPLICATION, WITH LONG-TERM CURRENT USE OF INSULIN (MULTI): ICD-10-CM

## 2024-04-22 DIAGNOSIS — M65.331 ACQUIRED TRIGGER FINGER OF RIGHT MIDDLE FINGER: ICD-10-CM

## 2024-04-22 DIAGNOSIS — G56.03 BILATERAL CARPAL TUNNEL SYNDROME: ICD-10-CM

## 2024-04-22 DIAGNOSIS — M43.22 CERVICAL VERTEBRAL FUSION: ICD-10-CM

## 2024-04-22 DIAGNOSIS — I10 PRIMARY HYPERTENSION: ICD-10-CM

## 2024-04-22 DIAGNOSIS — M51.9 LUMBAR DISC DISEASE: ICD-10-CM

## 2024-04-22 DIAGNOSIS — M79.642 LEFT HAND PAIN: ICD-10-CM

## 2024-04-22 DIAGNOSIS — M19.042 PRIMARY OSTEOARTHRITIS OF BOTH HANDS: ICD-10-CM

## 2024-04-22 DIAGNOSIS — M65.332 ACQUIRED TRIGGER FINGER OF LEFT MIDDLE FINGER: Primary | ICD-10-CM

## 2024-04-22 DIAGNOSIS — Z79.4 TYPE 2 DIABETES MELLITUS WITH OTHER SPECIFIED COMPLICATION, WITH LONG-TERM CURRENT USE OF INSULIN (MULTI): ICD-10-CM

## 2024-04-22 DIAGNOSIS — M19.041 PRIMARY OSTEOARTHRITIS OF BOTH HANDS: ICD-10-CM

## 2024-04-22 PROCEDURE — 4010F ACE/ARB THERAPY RXD/TAKEN: CPT | Performed by: ORTHOPAEDIC SURGERY

## 2024-04-22 PROCEDURE — 73130 X-RAY EXAM OF HAND: CPT | Mod: LEFT SIDE | Performed by: RADIOLOGY

## 2024-04-22 PROCEDURE — 1036F TOBACCO NON-USER: CPT | Performed by: ORTHOPAEDIC SURGERY

## 2024-04-22 PROCEDURE — 73130 X-RAY EXAM OF HAND: CPT | Mod: LT

## 2024-04-22 PROCEDURE — 3008F BODY MASS INDEX DOCD: CPT | Performed by: ORTHOPAEDIC SURGERY

## 2024-04-22 PROCEDURE — 76942 ECHO GUIDE FOR BIOPSY: CPT | Performed by: ORTHOPAEDIC SURGERY

## 2024-04-22 PROCEDURE — 99204 OFFICE O/P NEW MOD 45 MIN: CPT | Performed by: ORTHOPAEDIC SURGERY

## 2024-04-22 PROCEDURE — 20550 NJX 1 TENDON SHEATH/LIGAMENT: CPT | Performed by: ORTHOPAEDIC SURGERY

## 2024-04-22 RX ORDER — LIDOCAINE HYDROCHLORIDE 10 MG/ML
1 INJECTION INFILTRATION; PERINEURAL
Status: COMPLETED | OUTPATIENT
Start: 2024-04-22 | End: 2024-04-22

## 2024-04-22 RX ORDER — METHYLPREDNISOLONE ACETATE 40 MG/ML
20 INJECTION, SUSPENSION INTRA-ARTICULAR; INTRALESIONAL; INTRAMUSCULAR; SOFT TISSUE
Status: COMPLETED | OUTPATIENT
Start: 2024-04-22 | End: 2024-04-22

## 2024-04-22 RX ADMIN — LIDOCAINE HYDROCHLORIDE 1 ML: 10 INJECTION INFILTRATION; PERINEURAL at 11:18

## 2024-04-22 RX ADMIN — LIDOCAINE HYDROCHLORIDE 1 ML: 10 INJECTION INFILTRATION; PERINEURAL at 11:17

## 2024-04-22 RX ADMIN — METHYLPREDNISOLONE ACETATE 20 MG: 40 INJECTION, SUSPENSION INTRA-ARTICULAR; INTRALESIONAL; INTRAMUSCULAR; SOFT TISSUE at 11:18

## 2024-04-22 RX ADMIN — METHYLPREDNISOLONE ACETATE 20 MG: 40 INJECTION, SUSPENSION INTRA-ARTICULAR; INTRALESIONAL; INTRAMUSCULAR; SOFT TISSUE at 11:17

## 2024-04-22 ASSESSMENT — ENCOUNTER SYMPTOMS
FATIGUE: 0
FEVER: 0
ARTHRALGIAS: 1
SHORTNESS OF BREATH: 0
CHILLS: 0
WHEEZING: 0

## 2024-04-22 ASSESSMENT — PAIN - FUNCTIONAL ASSESSMENT: PAIN_FUNCTIONAL_ASSESSMENT: 0-10

## 2024-04-22 ASSESSMENT — PAIN SCALES - GENERAL: PAINLEVEL_OUTOF10: 6

## 2024-04-22 NOTE — PROGRESS NOTES
Reason for Appointment  Chief Complaint   Patient presents with    Left Hand - Pain     History of Present Illness  New patient is a 58 y.o. female here today for evaluation of left hand pain. X-rays today show that she is ulnar neutral, mild distal joint arthritis, mild to moderate CMC arthritis. She has a history of a left long trigger finger that has become so severe that she cannot move it. Symptoms began over a year ago and she has never had any treatment for it. She was diagnosed with carpal tunnel, but her symptoms resolved with bracing. She is starting to develop a trigger finger in the right long finger with pain and swelling in the right palm. She had a full workup from rheumatology and has seen a rheumatologist. She has had surgery on her neck in  for a collapsed vertebrae. No radicular symptoms.    Past Medical History:   Diagnosis Date    Mixed hyperlipidemia     Personal history of other diseases of the circulatory system     History of pulmonary hypertension    Personal history of other specified conditions     History of urinary frequency    Type 2 diabetes mellitus without complications (Multi)     Type 2 diabetes mellitus without complication       Past Surgical History:   Procedure Laterality Date    CERVICAL DISC SURGERY      OTHER SURGICAL HISTORY  2020     section    OTHER SURGICAL HISTORY  2020    Cholecystectomy    OTHER SURGICAL HISTORY  2008    corpectomy,partial C5-6    TUBAL LIGATION         Medication Documentation Review Audit       Reviewed by Genevieve Holly MA (Medical Assistant) on 24 at 1100      Medication Order Taking? Sig Documenting Provider Last Dose Status   atorvastatin (Lipitor) 20 mg tablet [id number] Yes Take 1 tablet (20 mg) by mouth once daily. Jerson Shahid MD Taking Active   Basaglar KwikPen U-100 Insulin 100 unit/mL (3 mL) pen [id number] Yes -start 10 units at bedtime, up to 30 units daily as directed Rito Hernandez MD Taking  "Active   blood sugar diagnostic strip [id number] Yes 1 x daily for 90 days Historical Provider, MD Taking Active   cholecalciferol (Vitamin D-3) 25 MCG (1000 UT) tablet [id number] Yes Take 1 tablet (1,000 Units) by mouth once daily. Jerson Shahid MD Taking Active   glimepiride (Amaryl) 2 mg tablet [id number] Yes Take 1 tablet (2 mg) by mouth 2 times a day before meals. Jerson Shahid MD Taking Active   lancets misc [id number] Yes as directed As directed for 90 days Historical Provider, MD Taking Active   lisinopril 10 mg tablet [id number] Yes Take 1 tablet (10 mg) by mouth once daily. Jerson Shahid MD Taking Active   metFORMIN (Glucophage) 1,000 mg tablet [id number] Yes Take 1 tablet (1,000 mg) by mouth 2 times a day with meals. Jerson Shahid MD Taking Active   multivitamin with minerals iron-free (Centrum Silver) [id number] Yes Take 1 tablet by mouth once daily. Jerson Shahid MD Taking Active   omeprazole (PriLOSEC) 20 mg DR capsule [id number] Yes Take 1 capsule (20 mg) by mouth once daily. Do not crush or chew. Jerson Shahid MD Taking Active   oxybutynin XL (Ditropan-XL) 10 mg 24 hr tablet [id number] Yes Take 1 tablet (10 mg) by mouth once daily. Jerson Shahid MD Taking Active   pen needle, diabetic 32 gauge x 5/32\" needle [id number] Yes Use daily Rito Hernandez MD Taking Active   pioglitazone (Actos) 30 mg tablet [id number] Yes Take 1 tablet (30 mg) by mouth once daily. Jerson Shahid MD Taking Active                    No Known Allergies    Review of Systems   Constitutional:  Negative for chills, fatigue and fever.   Respiratory:  Negative for shortness of breath and wheezing.    Cardiovascular:  Negative for chest pain and leg swelling.   Musculoskeletal:  Positive for arthralgias.   All other systems reviewed and are negative.      Exam   On exam, there is a well-healed cervical scar, stiffness and thoracic kyphosis in the neck, full shoulder ROM, good deltoid strength, good biceps and " triceps strength, good cuff strength, good elbow ROM, good wrist ROM, swelling in both hands especially the distal joints, no significant CMC tenderness, stiffness in the digits, severe tenderness A1 pulley left long, triggering left long A1 pulley with better motion in that digit, negative Tinel's median and ulnar nerves, no atrophy, no hyperreflexia, negative Shin's sign, no skin change    Assessment   Encounter Diagnoses   Name Primary?    Left hand pain     Lumbar disc disease     Primary hypertension     Type 2 diabetes mellitus with other specified complication, with long-term current use of insulin (Multi)     Primary osteoarthritis of both hands     Bilateral carpal tunnel syndrome     Cervical vertebral fusion     Acquired trigger finger of left middle finger Yes    Acquired trigger finger of right middle finger        Plan   She has not been able to fully flex her left long finger for some time. We can try an injection today and she will work on regaining a full fist. We did discuss surgical release if symptoms are not relieved with injection. She has a less severe trigger in the right long finger which I will inject today as well. We did discuss the risk of elevated blood sugar with cortisone injection.     She has a history of carpal tunnel syndrome that has been treated well with bracing. She will follow-up if she has any increased numbness. I advised her to have her neck checked up with her history of fusion.     Today we discussed conservative treatment. At this point, the patient is experiencing bilateral long finger pain that is consistent with trigger finger on clinical examination. We will do one cortisone injection into the bilateral long finger A1 pulley tendon sheath in hopes to calm their symptoms nicely. Patient will follow-up in four weeks, if needed. We did discuss possible surgical release in the future when symptoms recur.       Patient ID: Sandra Bradshaw is a 58 y.o. female.    Hand /  UE Inj/Asp: R long A1 for trigger finger on 4/22/2024 11:17 AM  Indications: pain  Details: 25 G needle, ultrasound-guided  Medications: 1 mL lidocaine 10 mg/mL (1 %); 20 mg methylPREDNISolone acetate 40 mg/mL  Outcome: tolerated well, no immediate complications    After discussing the risks and benefits of the procedure we proceeded with the injection. Under ultrasound guidance we identified the metacarpal bone and overlying tendon sheath with the FDS and FDP tendons, images obtained. We then sterilely injected the right long finger A1 pulley with a mixture of 20 mg of DepoMedrol and .5 cc of 1% lidocaine. Pt tolerated the procedure well without any adverse reactions.    Procedure, treatment alternatives, risks and benefits explained, specific risks discussed. Consent was given by the patient. Immediately prior to procedure a time out was called to verify the correct patient, procedure, equipment, support staff and site/side marked as required. Patient was prepped and draped in the usual sterile fashion.       Hand / UE Inj/Asp: L long A1 for trigger finger on 4/22/2024 11:18 AM  Indications: pain  Details: 25 G needle, ultrasound-guided  Medications: 1 mL lidocaine 10 mg/mL (1 %); 20 mg methylPREDNISolone acetate 40 mg/mL  Outcome: tolerated well, no immediate complications    After discussing the risks and benefits of the procedure we proceeded with the injection. Under ultrasound guidance we identified the metacarpal bone and overlying tendon sheath with the FDS and FDP tendons, images obtained. We then sterilely injected the left long finger A1 pulley with a mixture of 20 mg of DepoMedrol and .5 cc of 1% lidocaine. Pt tolerated the procedure well without any adverse reactions.    Procedure, treatment alternatives, risks and benefits explained, specific risks discussed. Consent was given by the patient. Immediately prior to procedure a time out was called to verify the correct patient, procedure, equipment,  support staff and site/side marked as required. Patient was prepped and draped in the usual sterile fashion.         I, Isis Johnson, attest that this documentation has been prepared under the direction and in the presence of Matthew Nesbitt MD. By signing below, I, Matthew Nesbitt MD, personally performed the services described in this documentation. All medical record entries made by the scribe were at my direction and in my presence. I have reviewed the chart and agree that the record reflects my personal performance and is accurate and complete. 04/22/24

## 2024-05-23 ENCOUNTER — TELEPHONE (OUTPATIENT)
Dept: ENDOCRINOLOGY | Facility: CLINIC | Age: 59
End: 2024-05-23

## 2024-05-23 ENCOUNTER — CLINICAL SUPPORT (OUTPATIENT)
Dept: ENDOCRINOLOGY | Facility: CLINIC | Age: 59
End: 2024-05-23
Payer: MEDICARE

## 2024-05-23 VITALS
SYSTOLIC BLOOD PRESSURE: 127 MMHG | BODY MASS INDEX: 33.79 KG/M2 | DIASTOLIC BLOOD PRESSURE: 62 MMHG | HEART RATE: 71 BPM | WEIGHT: 184.8 LBS

## 2024-05-23 DIAGNOSIS — E11.65 TYPE 2 DIABETES MELLITUS WITH HYPERGLYCEMIA, UNSPECIFIED WHETHER LONG TERM INSULIN USE (MULTI): Primary | ICD-10-CM

## 2024-05-23 DIAGNOSIS — I10 ESSENTIAL HYPERTENSION: ICD-10-CM

## 2024-05-23 DIAGNOSIS — E78.2 MIXED HYPERLIPIDEMIA: ICD-10-CM

## 2024-05-23 LAB — POC HEMOGLOBIN A1C: 6.6 % (ref 4.2–6.5)

## 2024-05-23 PROCEDURE — 3008F BODY MASS INDEX DOCD: CPT | Performed by: INTERNAL MEDICINE

## 2024-05-23 PROCEDURE — 3074F SYST BP LT 130 MM HG: CPT | Performed by: INTERNAL MEDICINE

## 2024-05-23 PROCEDURE — 83036 HEMOGLOBIN GLYCOSYLATED A1C: CPT | Performed by: INTERNAL MEDICINE

## 2024-05-23 PROCEDURE — 3078F DIAST BP <80 MM HG: CPT | Performed by: INTERNAL MEDICINE

## 2024-05-23 PROCEDURE — 4010F ACE/ARB THERAPY RXD/TAKEN: CPT | Performed by: INTERNAL MEDICINE

## 2024-05-23 PROCEDURE — 99213 OFFICE O/P EST LOW 20 MIN: CPT | Performed by: INTERNAL MEDICINE

## 2024-05-23 NOTE — PATIENT INSTRUCTIONS
Decrease Glimepiride 2mg to 1 tablet once daily for now     Pending approval  Patient Assistance Program:  - begin Mounjaro 2.5mg once weekly for 4 total weeks.  Then,  increase to 5mg weekly thereafter   - stop Glimepiride     Get Livia a copy most recent 1040 form for application to  Patient Assistance Program    Lab Results   Component Value Date    HGBA1C 6.6 (A) 05/23/2024   KEEP UP THE GREAT WORK!

## 2024-05-23 NOTE — PROGRESS NOTES
HPI     59 yo with Diabetes 2 (dx 2007), HTN, Dyslipidemia, fatty liver, reflux. A1c-7.9% last visit, today 6.6%.     Pt is testing sugars 2-3 times per day. Pt is having low sugars 0 times/week. Pt's typical blood sugars are running 130-150's on waking, 's at lunch, 150's at dinner, 's at bedtime. Pt is following a carb controlled diet and knows reasonable carb allowances. Pt is able to afford their medications. Pt is not all that active, physical at work.          Taking metformin 1gram bid, anuj 30mg -incr last visit, glimepiride 2mg bid.    Unable to start basal insulin after last visit as suggested d/t cost.   Mounjaro and trulicity worked in the past,  issue is coverage and high deductible.    Taking atorvastatin 20mg for lipids and tolerating.     Taking lisinopril 10mg for htn.         Current Outpatient Medications:     atorvastatin (Lipitor) 20 mg tablet, Take 1 tablet (20 mg) by mouth once daily., Disp: 90 tablet, Rfl: 3    blood sugar diagnostic strip, 1 x daily for 90 days, Disp: , Rfl:     cholecalciferol (Vitamin D-3) 25 MCG (1000 UT) tablet, Take 1 tablet (1,000 Units) by mouth once daily., Disp: , Rfl:     glimepiride (Amaryl) 2 mg tablet, Take 1 tablet (2 mg) by mouth 2 times a day before meals., Disp: 180 tablet, Rfl: 3    lancets misc, as directed As directed for 90 days, Disp: , Rfl:     lisinopril 10 mg tablet, Take 1 tablet (10 mg) by mouth once daily., Disp: 90 tablet, Rfl: 3    metFORMIN (Glucophage) 1,000 mg tablet, Take 1 tablet (1,000 mg) by mouth 2 times a day with meals., Disp: 180 tablet, Rfl: 3    multivitamin with minerals iron-free (Centrum Silver), Take 1 tablet by mouth once daily., Disp: , Rfl:     omeprazole (PriLOSEC) 20 mg DR capsule, Take 1 capsule (20 mg) by mouth once daily. Do not crush or chew., Disp: 90 capsule, Rfl: 3    oxybutynin XL (Ditropan-XL) 10 mg 24 hr tablet, Take 1 tablet (10 mg) by mouth once daily., Disp: 90 tablet, Rfl: 3    pioglitazone (Actos)  "30 mg tablet, Take 1 tablet (30 mg) by mouth once daily., Disp: 90 tablet, Rfl: 3    Allergies as of 05/23/2024    (No Known Allergies)       /62   Pulse 71   Wt 83.8 kg (184 lb 12.8 oz)   BMI 33.79 kg/m²     Labs:   Lab Results   Component Value Date    WBC 8.0 03/20/2024    NRBC 0.0 03/20/2024    RBC 4.41 03/20/2024    HGB 11.3 (L) 03/20/2024    HCT 36.6 03/20/2024     03/20/2024     Lab Results   Component Value Date    CALCIUM 9.6 03/20/2024    AST 16 03/20/2024    ALKPHOS 64 03/20/2024    BILITOT 0.4 03/20/2024    PROT 7.7 03/20/2024    ALBUMIN 4.5 03/20/2024    GLOB 2.5 09/20/2023    AGR 1.8 09/20/2023     03/20/2024    K 3.9 03/20/2024     03/20/2024    CO2 28 03/20/2024    ANIONGAP 12 03/20/2024    BUN 12 03/20/2024    CREATININE 0.57 03/20/2024    UREACREAUR 15.0 09/20/2023    GLUCOSE 130 (H) 03/20/2024    ALT 16 03/20/2024    EGFR >90 03/20/2024     Lab Results   Component Value Date    CHOL 138 09/20/2023    TRIG 162 (H) 09/20/2023    HDL 55 09/20/2023    LDLCALC 51 (L) 09/20/2023     Lab Results   Component Value Date    MICROALBCREA 34.4 (H) 09/20/2023     Lab Results   Component Value Date    TSH 2.58 09/20/2023     No results found for: \"PCQGPKOL20\"  Lab Results   Component Value Date    HGBA1C 6.6 (A) 05/23/2024         Assessment/Plan   1. Type 2 diabetes mellitus with hyperglycemia, unspecified whether long term insulin use (Multi)    -A1c ordered and reviewed  -glycemic values reviewed  -lab tests reviewed    -decrease PEREA from bid to qd for now    Pending approval  Patient Assistance Program:  - begin Mounjaro 2.5mg weekly x4 weeks.  Increase to 5mg weekly thereafter   - stop Glimepiride        Patient Assistance Screening (VAF)  Patient verbally reports monthly or yearly income which is less than 400% federal poverty level.  Application for program would be submitted for the following medications: mounjaro 2.5/5mg   - pt to provide copy of most recent 6250 Form to " start application process      2. Essential hypertension  -at target on therapy    3. Mixed hyperlipidemia  -on statin, ldl<70, labs reviewed, no change in therapy        Follow up: 4mon BB    -labs/tests/notes reviewed  -reviewed and counseled patient on medication monitoring and side effects    Treatment and plan discussed with Dr. Hernandez.  ANTONI Oakley, PharmD, BC-ADM, CDCES.

## 2024-05-23 NOTE — TELEPHONE ENCOUNTER
CALLED AND LEFT MESSAGE FOR SILVERIO GA TO CALL US BACK TO SCHEDULE A 4 MONTH FOLLOW-UP WITH DR. NEIL.

## 2024-05-23 NOTE — PROGRESS NOTES
I, Dr Rito Hernandez, have reviewed this progress note, medication list, vital signs, any pertinent lab values, and any CGM data if present with the [facility] and  face to face during this visit today. This note reflects the treatment plan that was made under my direction after reviewing the above mentioned elements while face to face with the patient and CDE.  I personally answered and addressed any questions and concerns the patient had during the visit today.  The CDE entered the data in this note under my direction and I personally reviewed it, signed any lab or medication orders that I instructed to be completed. I am the billing provider for this visit and the level of service was determined by my involvement in the Medical Decision Making Component of this visit while face to face with the patient.    Electronically signed by Rito Hernandez MD on 5/23/24 at 1:26 PM.

## 2024-05-24 ENCOUNTER — TELEMEDICINE (OUTPATIENT)
Dept: PHARMACY | Facility: HOSPITAL | Age: 59
End: 2024-05-24
Payer: MEDICARE

## 2024-05-24 DIAGNOSIS — E11.65 TYPE 2 DIABETES MELLITUS WITH HYPERGLYCEMIA, UNSPECIFIED WHETHER LONG TERM INSULIN USE (MULTI): ICD-10-CM

## 2024-05-29 ENCOUNTER — APPOINTMENT (OUTPATIENT)
Dept: ENDOCRINOLOGY | Facility: CLINIC | Age: 59
End: 2024-05-29
Payer: MEDICARE

## 2024-06-03 RX ORDER — TIRZEPATIDE 2.5 MG/.5ML
2.5 INJECTION, SOLUTION SUBCUTANEOUS
Qty: 2 ML | Refills: 0 | Status: SHIPPED | OUTPATIENT
Start: 2024-06-09

## 2024-06-03 RX ORDER — TIRZEPATIDE 5 MG/.5ML
5 INJECTION, SOLUTION SUBCUTANEOUS
Qty: 6 ML | Refills: 3 | Status: SHIPPED | OUTPATIENT
Start: 2024-06-03

## 2024-06-04 ENCOUNTER — SPECIALTY PHARMACY (OUTPATIENT)
Dept: PHARMACY | Facility: CLINIC | Age: 59
End: 2024-06-04

## 2024-06-04 PROCEDURE — RXMED WILLOW AMBULATORY MEDICATION CHARGE

## 2024-06-04 NOTE — PROGRESS NOTES
[facility]      Sandra hager 58 y.o. patient was referred to the Clinical Pharmacy Team for diabetes management.  Presents today via telephone for initial assessment and management.      Referring Provider: Rito Hernandez MD       Taking metformin 1gram bid, anuj 30mg, glimepiride 2mg qd.    Unable to start basal insulin after last visit as suggested d/t cost.   Mounjaro and trulicity worked in the past,  issue is coverage and high deductible.    Taking atorvastatin 20mg for lipids and tolerating.     Taking lisinopril 10mg for htn.         Current Outpatient Medications:     atorvastatin (Lipitor) 20 mg tablet, Take 1 tablet (20 mg) by mouth once daily., Disp: 90 tablet, Rfl: 3    blood sugar diagnostic strip, 1 x daily for 90 days, Disp: , Rfl:     cholecalciferol (Vitamin D-3) 25 MCG (1000 UT) tablet, Take 1 tablet (1,000 Units) by mouth once daily., Disp: , Rfl:     glimepiride (Amaryl) 2 mg tablet, Take 1 tablet (2 mg) by mouth 2 times a day before meals., Disp: 180 tablet, Rfl: 3    lancets misc, as directed As directed for 90 days, Disp: , Rfl:     lisinopril 10 mg tablet, Take 1 tablet (10 mg) by mouth once daily., Disp: 90 tablet, Rfl: 3    metFORMIN (Glucophage) 1,000 mg tablet, Take 1 tablet (1,000 mg) by mouth 2 times a day with meals., Disp: 180 tablet, Rfl: 3    multivitamin with minerals iron-free (Centrum Silver), Take 1 tablet by mouth once daily., Disp: , Rfl:     omeprazole (PriLOSEC) 20 mg DR capsule, Take 1 capsule (20 mg) by mouth once daily. Do not crush or chew., Disp: 90 capsule, Rfl: 3    oxybutynin XL (Ditropan-XL) 10 mg 24 hr tablet, Take 1 tablet (10 mg) by mouth once daily., Disp: 90 tablet, Rfl: 3    pioglitazone (Actos) 30 mg tablet, Take 1 tablet (30 mg) by mouth once daily., Disp: 90 tablet, Rfl: 3     Allergies as of 05/24/2024    (No Known Allergies)       Lab Results   Component Value Date    HGBA1C 6.6 (A) 05/23/2024       Lab Results   Component Value  Date    BILITOT 0.4 03/20/2024    CALCIUM 9.6 03/20/2024    CO2 28 03/20/2024     03/20/2024    CREATININE 0.57 03/20/2024    GLUCOSE 130 (H) 03/20/2024    ALKPHOS 64 03/20/2024    K 3.9 03/20/2024    PROT 7.7 03/20/2024     03/20/2024    AST 16 03/20/2024    ALT 16 03/20/2024    BUN 12 03/20/2024    ANIONGAP 12 03/20/2024    ALBUMIN 4.5 03/20/2024    EGFR >90 03/20/2024       Lab Results   Component Value Date    CHOL 138 09/20/2023    TRIG 162 (H) 09/20/2023    HDL 55 09/20/2023    LDLCALC 51 (L) 09/20/2023       Lab Results   Component Value Date    MICROALBCREA 34.4 (H) 09/20/2023        Patient Assistance Screening (VAF)  Patient verbally reports monthly or yearly income which is less than 400% federal poverty level.  Application for program has been submitted for the following medications: Mounjaro  Patient has been informed that program team will be reaching out to them to discuss necessary documentation, instructed to answer phone/return voicemail.   Patient aware this process may take up to 6 weeks.   If approved medication must be filled through [facility] pharmacy and may be picked up or mailed to patient.    PATIENT EDUCATION/DISCUSSION:  - Counseled patient on MOA, expectations, side effects, duration of therapy, contraindications, administration, and monitoring parameters  - Answered all patient questions and concerns    Assessment/Plan   1. Type 2 diabetes mellitus with hyperglycemia, unspecified whether long term insulin use (Multi)      Pending approval  Patient Assistance Program:  - begin Mounjaro 2.5mg weekly x4 weeks.  Increase to 5mg weekly thereafter   - stop Glimepiride       Continue all meds under the continuation of care with the referring provider and clinical pharmacy team.  Prescription(s) sent to [facility] pharmacy for assistance on authorization and copay. Medication will be mailed to patient.    Follow up as scheduled with Dr. Hernandez & team in endocrinology  office.  Follow up annually with clinical pharmacist for re-enrollment in  PAP    Thank you,   Livia Oakley, PharmD, BC-ADM, CDCES     Verbal consent to manage patient's drug therapy was obtained from the patient and/or an individual authorized to act on behalf of a patient. They were informed they may decline to participate or withdraw from participation in pharmacy services at any time.       ADDENDUM 9/17/24:  -mounjaro increased today's endo visit, 7.5mg x4 weeks then 10mg weekly thereafter   -new erxs sent to [facility] to be via pt's  PAP

## 2024-06-06 ENCOUNTER — HOSPITAL ENCOUNTER (OUTPATIENT)
Dept: RADIOLOGY | Facility: CLINIC | Age: 59
Discharge: HOME | End: 2024-06-06
Payer: MEDICARE

## 2024-06-06 ENCOUNTER — PHARMACY VISIT (OUTPATIENT)
Dept: PHARMACY | Facility: CLINIC | Age: 59
End: 2024-06-06
Payer: COMMERCIAL

## 2024-06-06 VITALS — WEIGHT: 182 LBS | HEIGHT: 61 IN | BODY MASS INDEX: 34.36 KG/M2

## 2024-06-06 DIAGNOSIS — Z12.31 ENCOUNTER FOR SCREENING MAMMOGRAM FOR BREAST CANCER: ICD-10-CM

## 2024-06-06 PROCEDURE — 77067 SCR MAMMO BI INCL CAD: CPT

## 2024-06-06 PROCEDURE — 77063 BREAST TOMOSYNTHESIS BI: CPT | Performed by: RADIOLOGY

## 2024-06-06 PROCEDURE — 77067 SCR MAMMO BI INCL CAD: CPT | Performed by: RADIOLOGY

## 2024-06-28 DIAGNOSIS — E11.65 TYPE 2 DIABETES MELLITUS WITH HYPERGLYCEMIA, UNSPECIFIED WHETHER LONG TERM INSULIN USE (MULTI): ICD-10-CM

## 2024-07-02 RX ORDER — TIRZEPATIDE 2.5 MG/.5ML
2.5 INJECTION, SOLUTION SUBCUTANEOUS
Qty: 2 ML | Refills: 0 | OUTPATIENT
Start: 2024-07-07

## 2024-07-03 PROCEDURE — RXMED WILLOW AMBULATORY MEDICATION CHARGE

## 2024-07-05 ENCOUNTER — PHARMACY VISIT (OUTPATIENT)
Dept: PHARMACY | Facility: CLINIC | Age: 59
End: 2024-07-05
Payer: COMMERCIAL

## 2024-08-07 ENCOUNTER — PHARMACY VISIT (OUTPATIENT)
Dept: PHARMACY | Facility: CLINIC | Age: 59
End: 2024-08-07
Payer: COMMERCIAL

## 2024-08-07 PROCEDURE — RXMED WILLOW AMBULATORY MEDICATION CHARGE

## 2024-09-04 PROCEDURE — RXMED WILLOW AMBULATORY MEDICATION CHARGE

## 2024-09-06 ENCOUNTER — PHARMACY VISIT (OUTPATIENT)
Dept: PHARMACY | Facility: CLINIC | Age: 59
End: 2024-09-06
Payer: COMMERCIAL

## 2024-09-16 NOTE — PROGRESS NOTES
HPI   59 yo with Diabetes 2 (dx 2007), HTN, Dyslipidemia, fatty liver, reflux. A1c-6.6% last visit, today 7.1%.      Pt is testing sugars 1 times per day. Pt is having low sugars 0 times/week. Pt's typical blood sugars are running 140's-150's on waking, not testing at other times of day. Pt is following a carb controlled diet and knows reasonable carb allowances. Pt is able to afford their medications. Pt is not all that active, physical at work.          Taking metformin 1gram bid, anuj 30mg, mounjaro 5mg (trulicity in past) through  pap  -no longer on glimepiride    Taking atorvastatin 20mg for lipids and tolerating.      Taking lisinopril 10mg for htn.       Current Outpatient Medications:     tirzepatide (Mounjaro) 5 mg/0.5 mL pen injector, Inject 5 mg under the skin 1 (one) time per week., Disp: 6 mL, Rfl: 3    atorvastatin (Lipitor) 20 mg tablet, Take 1 tablet (20 mg) by mouth once daily., Disp: 90 tablet, Rfl: 3    blood sugar diagnostic strip, 1 x daily for 90 days, Disp: , Rfl:     cholecalciferol (Vitamin D-3) 25 MCG (1000 UT) tablet, Take 1 tablet (1,000 Units) by mouth once daily., Disp: , Rfl:     glimepiride (Amaryl) 2 mg tablet, Take 1 tablet (2 mg) by mouth 2 times a day before meals., Disp: 180 tablet, Rfl: 3    lancets misc, as directed As directed for 90 days, Disp: , Rfl:     lisinopril 10 mg tablet, Take 1 tablet (10 mg) by mouth once daily., Disp: 90 tablet, Rfl: 3    metFORMIN (Glucophage) 1,000 mg tablet, Take 1 tablet (1,000 mg) by mouth 2 times a day with meals., Disp: 180 tablet, Rfl: 3    multivitamin with minerals iron-free (Centrum Silver), Take 1 tablet by mouth once daily., Disp: , Rfl:     omeprazole (PriLOSEC) 20 mg DR capsule, Take 1 capsule (20 mg) by mouth once daily. Do not crush or chew., Disp: 90 capsule, Rfl: 3    oxybutynin XL (Ditropan-XL) 10 mg 24 hr tablet, Take 1 tablet (10 mg) by mouth once daily., Disp: 90 tablet, Rfl: 3    pioglitazone (Actos) 30 mg tablet, Take 1  tablet (30 mg) by mouth once daily., Disp: 90 tablet, Rfl: 3    tirzepatide (Mounjaro) 2.5 mg/0.5 mL pen injector, Inject 2.5 mg under the skin 1 (one) time per week. For first 4 weeks (Patient not taking: Reported on 9/17/2024), Disp: 2 mL, Rfl: 0      Allergies as of 09/17/2024    (No Known Allergies)         Review of Systems   Cardiology: Lightheadedness-denies.  Chest pain-denies.  Leg edema-denies.  Palpitations-denies.  Respiratory: Cough-denies. Shortness of breath-denies.  Wheezing-denies.  Gastroenterology: Constipation-denies, on miralax controlled.  Diarrhea-denies.  Heartburn-denies.  Endocrinology: Cold intolerance-denies.  Heat intolerance-denies.  Sweats-denies.  Neurology: Headache-denies.  Tremor-denies.  Neuropathy in extremities-denies.  Psychology: Low energy-denies.  Irritability-denies.  Sleep disturbances-denies.      /70 (BP Location: Left arm, Patient Position: Sitting, BP Cuff Size: Adult)   Pulse 81   Wt 81 kg (178 lb 7.5 oz)   BMI 33.72 kg/m²       Labs:  Lab Results   Component Value Date    WBC 8.0 03/20/2024    NRBC 0.0 03/20/2024    RBC 4.41 03/20/2024    HGB 11.3 (L) 03/20/2024    HCT 36.6 03/20/2024     03/20/2024     Lab Results   Component Value Date    CALCIUM 9.6 03/20/2024    AST 16 03/20/2024    ALKPHOS 64 03/20/2024    BILITOT 0.4 03/20/2024    PROT 7.7 03/20/2024    ALBUMIN 4.5 03/20/2024    GLOB 2.5 09/20/2023    AGR 1.8 09/20/2023     03/20/2024    K 3.9 03/20/2024     03/20/2024    CO2 28 03/20/2024    ANIONGAP 12 03/20/2024    BUN 12 03/20/2024    CREATININE 0.57 03/20/2024    UREACREAUR 15.0 09/20/2023    GLUCOSE 130 (H) 03/20/2024    ALT 16 03/20/2024    EGFR >90 03/20/2024     Lab Results   Component Value Date    CHOL 138 09/20/2023    TRIG 162 (H) 09/20/2023    HDL 55 09/20/2023    LDLCALC 51 (L) 09/20/2023     Lab Results   Component Value Date    MICROALBCREA 34.4 (H) 09/20/2023     Lab Results   Component Value Date    TSH 2.58  "09/20/2023     No results found for: \"MMUNXMMM02\"  Lab Results   Component Value Date    HGBA1C 7.1 (A) 09/17/2024         Physical Exam   General Appearance: pleasant, cooperative, no acute distress  HEENT: no chemosis, no proptosis, no lid lag, no lid retraction  Neck: no lymphadenopathy, no thyromegaly, no dominant thyroid nodules  Heart: no murmurs, regular rate and rhythm, S1 and S2  Lungs: no wheezes, no rhonci, no rales  Extremities: no lower extremity swelling      Assessment/Plan   1. Type 2 diabetes mellitus with hyperglycemia, unspecified whether long term insulin use (Multi)  -A1c ordered and reviewed  -glycemic log reviewed  -labs reviewed  -refills from  pap requested    -wt loss with mounjaro, A1c higher off glimepiride  -increase mounjaro to 7.5mg weekly for 4 weeks, then 10mg weekly    2. Essential hypertension  -at target on therapy, no change, will follow    3. Mixed hyperlipidemia  -on statin and tolerating, no change, labs pending         Follow Up:  Mary 6 months    -labs/tests/notes reviewed  -reviewed and counseled patient on medication monitoring and side effects          "

## 2024-09-17 ENCOUNTER — APPOINTMENT (OUTPATIENT)
Dept: ENDOCRINOLOGY | Facility: CLINIC | Age: 59
End: 2024-09-17
Payer: MEDICARE

## 2024-09-17 VITALS
BODY MASS INDEX: 33.72 KG/M2 | WEIGHT: 178.47 LBS | SYSTOLIC BLOOD PRESSURE: 125 MMHG | DIASTOLIC BLOOD PRESSURE: 70 MMHG | HEART RATE: 81 BPM

## 2024-09-17 DIAGNOSIS — I10 ESSENTIAL HYPERTENSION: ICD-10-CM

## 2024-09-17 DIAGNOSIS — E11.65 TYPE 2 DIABETES MELLITUS WITH HYPERGLYCEMIA, UNSPECIFIED WHETHER LONG TERM INSULIN USE (MULTI): Primary | ICD-10-CM

## 2024-09-17 DIAGNOSIS — E78.2 MIXED HYPERLIPIDEMIA: ICD-10-CM

## 2024-09-17 LAB — POC HEMOGLOBIN A1C: 7.1 % (ref 4.2–6.5)

## 2024-09-17 PROCEDURE — 3074F SYST BP LT 130 MM HG: CPT | Performed by: INTERNAL MEDICINE

## 2024-09-17 PROCEDURE — 3078F DIAST BP <80 MM HG: CPT | Performed by: INTERNAL MEDICINE

## 2024-09-17 PROCEDURE — 83036 HEMOGLOBIN GLYCOSYLATED A1C: CPT | Performed by: INTERNAL MEDICINE

## 2024-09-17 PROCEDURE — 99214 OFFICE O/P EST MOD 30 MIN: CPT | Performed by: INTERNAL MEDICINE

## 2024-09-17 PROCEDURE — 1036F TOBACCO NON-USER: CPT | Performed by: INTERNAL MEDICINE

## 2024-09-17 PROCEDURE — 4010F ACE/ARB THERAPY RXD/TAKEN: CPT | Performed by: INTERNAL MEDICINE

## 2024-09-17 ASSESSMENT — ENCOUNTER SYMPTOMS: DEPRESSION: 0

## 2024-09-17 ASSESSMENT — PAIN SCALES - GENERAL: PAINLEVEL: 0-NO PAIN

## 2024-09-27 PROCEDURE — RXMED WILLOW AMBULATORY MEDICATION CHARGE

## 2024-10-01 ENCOUNTER — PHARMACY VISIT (OUTPATIENT)
Dept: PHARMACY | Facility: CLINIC | Age: 59
End: 2024-10-01
Payer: COMMERCIAL

## 2024-10-02 ENCOUNTER — OFFICE VISIT (OUTPATIENT)
Dept: PRIMARY CARE | Facility: CLINIC | Age: 59
End: 2024-10-02
Payer: MEDICARE

## 2024-10-02 VITALS
HEART RATE: 83 BPM | HEIGHT: 61 IN | DIASTOLIC BLOOD PRESSURE: 62 MMHG | WEIGHT: 179 LBS | OXYGEN SATURATION: 99 % | BODY MASS INDEX: 33.79 KG/M2 | SYSTOLIC BLOOD PRESSURE: 122 MMHG | TEMPERATURE: 96.7 F

## 2024-10-02 DIAGNOSIS — D64.9 ANEMIA, UNSPECIFIED TYPE: ICD-10-CM

## 2024-10-02 DIAGNOSIS — R32 URINARY INCONTINENCE, UNSPECIFIED TYPE: ICD-10-CM

## 2024-10-02 DIAGNOSIS — K76.0 NAFLD (NONALCOHOLIC FATTY LIVER DISEASE): ICD-10-CM

## 2024-10-02 DIAGNOSIS — Z12.11 ENCOUNTER FOR COLORECTAL CANCER SCREENING: ICD-10-CM

## 2024-10-02 DIAGNOSIS — Z13.6 ENCOUNTER FOR SCREENING FOR CARDIOVASCULAR DISORDERS: ICD-10-CM

## 2024-10-02 DIAGNOSIS — I10 PRIMARY HYPERTENSION: ICD-10-CM

## 2024-10-02 DIAGNOSIS — K21.9 GASTROESOPHAGEAL REFLUX DISEASE WITHOUT ESOPHAGITIS: ICD-10-CM

## 2024-10-02 DIAGNOSIS — E55.9 VITAMIN D DEFICIENCY: ICD-10-CM

## 2024-10-02 DIAGNOSIS — Z12.12 ENCOUNTER FOR COLORECTAL CANCER SCREENING: ICD-10-CM

## 2024-10-02 DIAGNOSIS — E78.2 MIXED HYPERLIPIDEMIA: ICD-10-CM

## 2024-10-02 DIAGNOSIS — M15.0 PRIMARY OSTEOARTHRITIS INVOLVING MULTIPLE JOINTS: ICD-10-CM

## 2024-10-02 DIAGNOSIS — Z00.00 ANNUAL PHYSICAL EXAM: Primary | ICD-10-CM

## 2024-10-02 DIAGNOSIS — Z01.89 ENCOUNTER FOR ROUTINE LABORATORY TESTING: ICD-10-CM

## 2024-10-02 DIAGNOSIS — Z23 ENCOUNTER FOR IMMUNIZATION: ICD-10-CM

## 2024-10-02 DIAGNOSIS — Z12.31 ENCOUNTER FOR SCREENING MAMMOGRAM FOR BREAST CANCER: ICD-10-CM

## 2024-10-02 DIAGNOSIS — E66.811 CLASS 1 OBESITY WITH SERIOUS COMORBIDITY AND BODY MASS INDEX (BMI) OF 33.0 TO 33.9 IN ADULT, UNSPECIFIED OBESITY TYPE: ICD-10-CM

## 2024-10-02 DIAGNOSIS — E11.9 TYPE 2 DIABETES MELLITUS WITHOUT COMPLICATION, WITHOUT LONG-TERM CURRENT USE OF INSULIN (MULTI): ICD-10-CM

## 2024-10-02 PROBLEM — M50.90 CERVICAL DISC DISEASE: Status: ACTIVE | Noted: 2024-10-02

## 2024-10-02 PROCEDURE — 99396 PREV VISIT EST AGE 40-64: CPT | Performed by: STUDENT IN AN ORGANIZED HEALTH CARE EDUCATION/TRAINING PROGRAM

## 2024-10-02 PROCEDURE — 90471 IMMUNIZATION ADMIN: CPT | Performed by: STUDENT IN AN ORGANIZED HEALTH CARE EDUCATION/TRAINING PROGRAM

## 2024-10-02 PROCEDURE — 3074F SYST BP LT 130 MM HG: CPT | Performed by: STUDENT IN AN ORGANIZED HEALTH CARE EDUCATION/TRAINING PROGRAM

## 2024-10-02 PROCEDURE — 90656 IIV3 VACC NO PRSV 0.5 ML IM: CPT | Performed by: STUDENT IN AN ORGANIZED HEALTH CARE EDUCATION/TRAINING PROGRAM

## 2024-10-02 PROCEDURE — 3008F BODY MASS INDEX DOCD: CPT | Performed by: STUDENT IN AN ORGANIZED HEALTH CARE EDUCATION/TRAINING PROGRAM

## 2024-10-02 PROCEDURE — 3078F DIAST BP <80 MM HG: CPT | Performed by: STUDENT IN AN ORGANIZED HEALTH CARE EDUCATION/TRAINING PROGRAM

## 2024-10-02 PROCEDURE — 99214 OFFICE O/P EST MOD 30 MIN: CPT | Performed by: STUDENT IN AN ORGANIZED HEALTH CARE EDUCATION/TRAINING PROGRAM

## 2024-10-02 PROCEDURE — 4010F ACE/ARB THERAPY RXD/TAKEN: CPT | Performed by: STUDENT IN AN ORGANIZED HEALTH CARE EDUCATION/TRAINING PROGRAM

## 2024-10-02 PROCEDURE — 1036F TOBACCO NON-USER: CPT | Performed by: STUDENT IN AN ORGANIZED HEALTH CARE EDUCATION/TRAINING PROGRAM

## 2024-10-02 ASSESSMENT — PATIENT HEALTH QUESTIONNAIRE - PHQ9
1. LITTLE INTEREST OR PLEASURE IN DOING THINGS: NOT AT ALL
SUM OF ALL RESPONSES TO PHQ9 QUESTIONS 1 AND 2: 0
2. FEELING DOWN, DEPRESSED OR HOPELESS: NOT AT ALL

## 2024-10-02 ASSESSMENT — ENCOUNTER SYMPTOMS
ENDOCRINE NEGATIVE: 1
CARDIOVASCULAR NEGATIVE: 1
MUSCULOSKELETAL NEGATIVE: 1
HEMATOLOGIC/LYMPHATIC NEGATIVE: 1
CONSTITUTIONAL NEGATIVE: 1
GASTROINTESTINAL NEGATIVE: 1
PSYCHIATRIC NEGATIVE: 1
NEUROLOGICAL NEGATIVE: 1
EYES NEGATIVE: 1
ALLERGIC/IMMUNOLOGIC NEGATIVE: 1
RESPIRATORY NEGATIVE: 1

## 2024-10-02 ASSESSMENT — PAIN SCALES - GENERAL: PAINLEVEL: 4

## 2024-10-02 NOTE — PROGRESS NOTES
[facility]: [facility]  PHYSICAL EXAM      Sandra Bradshaw is a 59 y.o. female that is presenting today for Annual Exam.    Assessment/Plan   Discussed routine and/or preventative care with the patient as outlined below:  - Labwork:   - Patient appears to be due for labwork. Ordered today.   - Notable that the patient just saw her endocrinologist and A1c at that time was 7.1%. Per patient, plan is to continue to work on increasing the dosage of the mounjaro.  - Will order labwork for the patient's next appointment. Encouraged the patient to get this labwork done one week prior to the next appointment.  - Imaging:   - Colorectal Cancer: Will not be due for this until 2028.  - Mammogram: Will not be due for this until Summer 2025. Ordered today.  - Encouraged the patient to get her cervical cancer screening done.  - Immunizations:   - Discussed seasonal immunizations, including the influenza and COVID-19 immunizations.   - Significant medication and problem list reconciliation done today.   - Blood pressure at goal today.  - Encouraged continued diet, exercise, and lifestyle modification.  - Patient notes that she accidentally hurt her R-lower back while bending over to clean her bathroom roughly 1.5 weeks ago. Since that time, the pain has been slowly getting better with OTC medications, creams, and a back brace. Discussed with her that I am okay with her continuing this course of treatment, but that if she were to hurt herself again, that we should refer her to physical therapy. Patient agreeable to the plan.  - Otherwise, the patient feels well and denies any other physical or mental health symptoms / concerns at this time.    Diagnoses and all orders for this visit:  Annual physical exam  Encounter for screening mammogram for breast cancer  -     BI mammo bilateral screening tomosynthesis; Future  Encounter for screening for cardiovascular disorders  Encounter for colorectal cancer  screening  Encounter for routine laboratory testing  -     Follow Up In Primary Care  Encounter for immunization  -     Flu vaccine, trivalent, preservative free, age 6 months and greater (Fluarix/Fluzone/Flulaval)  Class 1 obesity with serious comorbidity and body mass index (BMI) of 33.0 to 33.9 in adult, unspecified obesity type  -     Follow Up In Primary Care; Future  Type 2 diabetes mellitus without complication, without long-term current use of insulin (Multi)  -     TSH with reflex to Free T4 if abnormal; Future  -     Albumin-Creatinine Ratio, Urine Random; Future  -     Hemoglobin A1C; Future  -     Follow Up In Primary Care; Future  Gastroesophageal reflux disease without esophagitis  -     Follow Up In Primary Care; Future  Primary osteoarthritis involving multiple joints  -     Follow Up In Primary Care; Future  NAFLD (nonalcoholic fatty liver disease)  -     Hepatic Function Panel; Future  -     Hepatic Function Panel; Future  -     Follow Up In Primary Care; Future  Urinary incontinence, unspecified type  -     Follow Up In Primary Care; Future  Anemia, unspecified type  -     CBC and Auto Differential; Future  -     Folate; Future  -     Ferritin; Future  -     Vitamin B12; Future  -     Iron and TIBC; Future  -     CBC and Auto Differential; Future  -     Follow Up In Primary Care; Future  Mixed hyperlipidemia  -     Lipid Panel; Future  -     Follow Up In Primary Care; Future  Primary hypertension  -     Basic Metabolic Panel; Future  -     Basic Metabolic Panel; Future  -     Follow Up In Primary Care; Future  Vitamin D deficiency  -     Vitamin D 25-Hydroxy,Total (for eval of Vitamin D levels); Future  -     Follow Up In Primary Care; Future    Current Outpatient Medications   Medication Instructions    atorvastatin (LIPITOR) 20 mg, oral, Daily    blood sugar diagnostic strip 1 x daily for 90 days    cholecalciferol (VITAMIN D-3) 1,000 Units, oral, Daily    glimepiride (AMARYL) 2 mg, oral, 2  times daily before meals    lancets misc as directed As directed for 90 days    lisinopril 10 mg, oral, Daily    metFORMIN (GLUCOPHAGE) 1,000 mg, oral, 2 times daily (morning and late afternoon)    Mounjaro 7.5 mg, subcutaneous, Once Weekly, For 4 weeks    Mounjaro 10 mg, subcutaneous, Once Weekly    multivitamin with minerals iron-free (Centrum Silver) 1 tablet, oral, Daily    omeprazole (PRILOSEC) 20 mg, oral, Daily, Do not crush or chew.    oxybutynin XL (DITROPAN-XL) 10 mg, oral, Daily    pioglitazone (ACTOS) 30 mg, oral, Daily     Subjective   - The patient otherwise feels well and denies any acute symptoms or concerns at this time.  - The patient denies any changes or progression of their chronic medical problems.  - The patient denies any problems or concerns with their medications.      Review of Systems   Constitutional: Negative.    HENT: Negative.     Eyes: Negative.    Respiratory: Negative.     Cardiovascular: Negative.    Gastrointestinal: Negative.    Endocrine: Negative.    Genitourinary: Negative.    Musculoskeletal: Negative.    Skin: Negative.    Allergic/Immunologic: Negative.    Neurological: Negative.    Hematological: Negative.    Psychiatric/Behavioral: Negative.     All other systems reviewed and are negative.     Objective   Vitals:    10/02/24 1031   BP: 122/62   Pulse:    Temp:    SpO2:      Body mass index is 33.82 kg/m².  Physical Exam  Vitals and nursing note reviewed.   Constitutional:       General: She is not in acute distress.     Appearance: Normal appearance. She is not ill-appearing.   HENT:      Head: Normocephalic and atraumatic.      Right Ear: Tympanic membrane, ear canal and external ear normal. There is no impacted cerumen.      Left Ear: Tympanic membrane, ear canal and external ear normal. There is no impacted cerumen.      Nose: Nose normal.      Mouth/Throat:      Mouth: Mucous membranes are moist.      Pharynx: Oropharynx is clear. No oropharyngeal exudate or  posterior oropharyngeal erythema.   Eyes:      General: No scleral icterus.        Right eye: No discharge.         Left eye: No discharge.      Extraocular Movements: Extraocular movements intact.      Conjunctiva/sclera: Conjunctivae normal.      Pupils: Pupils are equal, round, and reactive to light.   Neck:      Vascular: No carotid bruit.   Cardiovascular:      Rate and Rhythm: Normal rate and regular rhythm.      Pulses: Normal pulses.      Heart sounds: Normal heart sounds. No murmur heard.     No friction rub. No gallop.   Pulmonary:      Effort: Pulmonary effort is normal. No respiratory distress.      Breath sounds: Normal breath sounds.   Abdominal:      General: Abdomen is flat. Bowel sounds are normal. There is no distension.      Palpations: Abdomen is soft.      Tenderness: There is no abdominal tenderness.      Hernia: No hernia is present.   Musculoskeletal:         General: No swelling or tenderness. Normal range of motion.   Lymphadenopathy:      Cervical: No cervical adenopathy.   Skin:     General: Skin is warm and dry.      Capillary Refill: Capillary refill takes less than 2 seconds.      Coloration: Skin is not jaundiced.      Findings: No rash.   Neurological:      General: No focal deficit present.      Mental Status: She is alert and oriented to person, place, and time. Mental status is at baseline.   Psychiatric:         Mood and Affect: Mood normal.         Behavior: Behavior normal.       Diagnostic Results   Lab Results   Component Value Date    GLUCOSE 130 (H) 03/20/2024    CALCIUM 9.6 03/20/2024     03/20/2024    K 3.9 03/20/2024    CO2 28 03/20/2024     03/20/2024    BUN 12 03/20/2024    CREATININE 0.57 03/20/2024     Lab Results   Component Value Date    ALT 16 03/20/2024    AST 16 03/20/2024    ALKPHOS 64 03/20/2024    BILITOT 0.4 03/20/2024     Lab Results   Component Value Date    WBC 8.0 03/20/2024    HGB 11.3 (L) 03/20/2024    HCT 36.6 03/20/2024    MCV 83  "2024     2024     Lab Results   Component Value Date    CHOL 138 2023    CHOL 122 (L) 2022    CHOL 138 10/26/2021     Lab Results   Component Value Date    HDL 55 2023    HDL 39 (L) 2022    HDL 42 (L) 10/26/2021     Lab Results   Component Value Date    LDLCALC 51 (L) 2023    LDLCALC 56 (L) 2022    LDLCALC 58 (L) 10/26/2021     Lab Results   Component Value Date    TRIG 162 (H) 2023    TRIG 135 2022    TRIG 191 (H) 10/26/2021     No components found for: \"CHOLHDL\"  Lab Results   Component Value Date    HGBA1C 7.1 (A) 2024     Other labs not included in the list above were reviewed either before or during this encounter.    History   Past Medical History:   Diagnosis Date    Essential hypertension     Mixed hyperlipidemia     Personal history of other diseases of the circulatory system     History of pulmonary hypertension    Personal history of other specified conditions     History of urinary frequency    Type 2 diabetes mellitus with hyperglycemia, unspecified whether long term insulin use (Multi)     Type 2 diabetes mellitus without complications (Multi)     Type 2 diabetes mellitus without complication     Past Surgical History:   Procedure Laterality Date    CERVICAL DISC SURGERY      OTHER SURGICAL HISTORY  2020     section    OTHER SURGICAL HISTORY  2020    Cholecystectomy    OTHER SURGICAL HISTORY  2008    corpectomy,partial C5-6    TUBAL LIGATION       Family History   Problem Relation Name Age of Onset    Diabetes Mother      Heart disease Mother      Diabetes Father      Hypertension Father      Stroke Father      Stomach cancer Paternal Grandmother       Social History     Socioeconomic History    Marital status:      Spouse name: Not on file    Number of children: Not on file    Years of education: Not on file    Highest education level: Not on file   Occupational History    Not on file   Tobacco Use "    Smoking status: Never     Passive exposure: Never    Smokeless tobacco: Never   Vaping Use    Vaping status: Never Used   Substance and Sexual Activity    Alcohol use: Yes     Comment: occasionally    Drug use: Never    Sexual activity: Not Currently     Partners: Male     Birth control/protection: Surgical   Other Topics Concern    Not on file   Social History Narrative    Not on file     Social Determinants of Health     Financial Resource Strain: Not on file   Food Insecurity: Not on file   Transportation Needs: Not on file   Physical Activity: Not on file   Stress: Not on file   Social Connections: Not on file   Intimate Partner Violence: Not on file   Housing Stability: Not on file     No Known Allergies  Current Outpatient Medications on File Prior to Visit   Medication Sig Dispense Refill    atorvastatin (Lipitor) 20 mg tablet Take 1 tablet (20 mg) by mouth once daily. 90 tablet 3    blood sugar diagnostic strip 1 x daily for 90 days      cholecalciferol (Vitamin D-3) 25 MCG (1000 UT) tablet Take 1 tablet (1,000 Units) by mouth once daily.      glimepiride (Amaryl) 2 mg tablet Take 1 tablet (2 mg) by mouth 2 times a day before meals. 180 tablet 3    lancets misc as directed As directed for 90 days      lisinopril 10 mg tablet Take 1 tablet (10 mg) by mouth once daily. 90 tablet 3    metFORMIN (Glucophage) 1,000 mg tablet Take 1 tablet (1,000 mg) by mouth 2 times a day with meals. 180 tablet 3    multivitamin with minerals iron-free (Centrum Silver) Take 1 tablet by mouth once daily.      omeprazole (PriLOSEC) 20 mg DR capsule Take 1 capsule (20 mg) by mouth once daily. Do not crush or chew. 90 capsule 3    oxybutynin XL (Ditropan-XL) 10 mg 24 hr tablet Take 1 tablet (10 mg) by mouth once daily. 90 tablet 3    pioglitazone (Actos) 30 mg tablet Take 1 tablet (30 mg) by mouth once daily. 90 tablet 3    tirzepatide (Mounjaro) 10 mg/0.5 mL pen injector Inject 10 mg under the skin 1 (one) time per week. 6 mL 3     tirzepatide (Mounjaro) 7.5 mg/0.5 mL pen injector Inject 7.5 mg under the skin 1 (one) time per week. For 4 weeks 2 mL 0     No current facility-administered medications on file prior to visit.     Immunization History   Administered Date(s) Administered    Flu vaccine (IIV4), preservative free *Check age/dose* 10/01/2020    Flu vaccine, quadrivalent, no egg protein, age 6 month or greater (FLUCELVAX) 12/28/2021, 09/21/2022, 10/01/2023    Influenza, Unspecified 09/21/2022    Influenza, injectable, quadrivalent 10/09/2015, 12/30/2016, 10/27/2017, 10/23/2018, 11/06/2019    Influenza, seasonal, injectable 11/30/2010, 10/22/2013, 09/19/2014    Novel influenza-H1N1-09, preservative-free 12/13/2009    PPD Test 10/22/2013, 10/27/2017, 10/23/2018    Pfizer COVID-19 vaccine, 12 years and older, (30mcg/0.3mL) (Comirnaty) 10/01/2023    Pfizer Purple Cap SARS-CoV-2 01/23/2021, 02/13/2021, 11/27/2021    Tdap vaccine, age 7 year and older (BOOSTRIX, ADACEL) 09/21/2023    Zoster vaccine, recombinant, adult (SHINGRIX) 08/21/2021, 04/18/2022     Patient's medical history was reviewed and updated either before or during this encounter.       Jerson Shahid MD

## 2024-10-02 NOTE — PATIENT INSTRUCTIONS
Discussed routine and/or preventative care with the patient as outlined below:  - Labwork:   - Patient appears to be due for labwork. Ordered today.   - Notable that the patient just saw her endocrinologist and A1c at that time was 7.1%. Per patient, plan is to continue to work on increasing the dosage of the mounjaro.  - Will order labwork for the patient's next appointment. Encouraged the patient to get this labwork done one week prior to the next appointment.  - Imaging:   - Colorectal Cancer: Will not be due for this until 2028.  - Mammogram: Will not be due for this until Summer 2025. Ordered today.  - Encouraged the patient to get her cervical cancer screening done.  - Immunizations:   - Discussed seasonal immunizations, including the influenza and COVID-19 immunizations.   - Significant medication and problem list reconciliation done today.   - Blood pressure at goal today.  - Encouraged continued diet, exercise, and lifestyle modification.  - Patient notes that she accidentally hurt her R-lower back while bending over to clean her bathroom roughly 1.5 weeks ago. Since that time, the pain has been slowly getting better with OTC medications, creams, and a back brace. Discussed with her that I am okay with her continuing this course of treatment, but that if she were to hurt herself again, that we should refer her to physical therapy. Patient agreeable to the plan.  - Otherwise, the patient feels well and denies any other physical or mental health symptoms / concerns at this time.

## 2024-10-10 ENCOUNTER — LAB (OUTPATIENT)
Dept: LAB | Facility: LAB | Age: 59
End: 2024-10-10
Payer: MEDICARE

## 2024-10-10 DIAGNOSIS — Z79.4 TYPE 2 DIABETES MELLITUS WITH OTHER SPECIFIED COMPLICATION, WITH LONG-TERM CURRENT USE OF INSULIN: ICD-10-CM

## 2024-10-10 DIAGNOSIS — I10 PRIMARY HYPERTENSION: ICD-10-CM

## 2024-10-10 DIAGNOSIS — D64.9 ANEMIA, UNSPECIFIED TYPE: ICD-10-CM

## 2024-10-10 DIAGNOSIS — Z01.89 ENCOUNTER FOR ROUTINE LABORATORY TESTING: ICD-10-CM

## 2024-10-10 DIAGNOSIS — E78.2 MIXED HYPERLIPIDEMIA: ICD-10-CM

## 2024-10-10 DIAGNOSIS — K76.0 NAFLD (NONALCOHOLIC FATTY LIVER DISEASE): ICD-10-CM

## 2024-10-10 DIAGNOSIS — E11.9 TYPE 2 DIABETES MELLITUS WITHOUT COMPLICATION, WITHOUT LONG-TERM CURRENT USE OF INSULIN (MULTI): ICD-10-CM

## 2024-10-10 DIAGNOSIS — E11.69 TYPE 2 DIABETES MELLITUS WITH OTHER SPECIFIED COMPLICATION, WITH LONG-TERM CURRENT USE OF INSULIN: ICD-10-CM

## 2024-10-10 DIAGNOSIS — E55.9 VITAMIN D DEFICIENCY: ICD-10-CM

## 2024-10-10 LAB
25(OH)D3 SERPL-MCNC: 45 NG/ML (ref 30–100)
ALBUMIN SERPL BCP-MCNC: 4.2 G/DL (ref 3.4–5)
ALP SERPL-CCNC: 59 U/L (ref 33–110)
ALT SERPL W P-5'-P-CCNC: 14 U/L (ref 7–45)
ANION GAP SERPL CALCULATED.3IONS-SCNC: 13 MMOL/L (ref 10–20)
AST SERPL W P-5'-P-CCNC: 15 U/L (ref 9–39)
BASOPHILS # BLD AUTO: 0.05 X10*3/UL (ref 0–0.1)
BASOPHILS NFR BLD AUTO: 0.6 %
BILIRUB DIRECT SERPL-MCNC: 0.1 MG/DL (ref 0–0.3)
BILIRUB SERPL-MCNC: 0.3 MG/DL (ref 0–1.2)
BUN SERPL-MCNC: 12 MG/DL (ref 6–23)
CALCIUM SERPL-MCNC: 9.4 MG/DL (ref 8.6–10.3)
CHLORIDE SERPL-SCNC: 104 MMOL/L (ref 98–107)
CHOLEST SERPL-MCNC: 128 MG/DL (ref 0–199)
CHOLEST/HDLC SERPL: 2.7 {RATIO}
CO2 SERPL-SCNC: 27 MMOL/L (ref 21–32)
CREAT SERPL-MCNC: 0.62 MG/DL (ref 0.5–1.05)
CREAT UR-MCNC: 144.8 MG/DL (ref 20–320)
EGFRCR SERPLBLD CKD-EPI 2021: >90 ML/MIN/1.73M*2
EOSINOPHIL # BLD AUTO: 0.62 X10*3/UL (ref 0–0.7)
EOSINOPHIL NFR BLD AUTO: 7.2 %
ERYTHROCYTE [DISTWIDTH] IN BLOOD BY AUTOMATED COUNT: 14.6 % (ref 11.5–14.5)
EST. AVERAGE GLUCOSE BLD GHB EST-MCNC: 143 MG/DL
FERRITIN SERPL-MCNC: 65 NG/ML (ref 8–150)
FOLATE SERPL-MCNC: 16.6 NG/ML
GLUCOSE SERPL-MCNC: 123 MG/DL (ref 74–99)
HBA1C MFR BLD: 6.6 %
HCT VFR BLD AUTO: 34.9 % (ref 36–46)
HDLC SERPL-MCNC: 47.9 MG/DL
HGB BLD-MCNC: 10.8 G/DL (ref 12–16)
IMM GRANULOCYTES # BLD AUTO: 0.05 X10*3/UL (ref 0–0.7)
IMM GRANULOCYTES NFR BLD AUTO: 0.6 % (ref 0–0.9)
IRON SATN MFR SERPL: 11 % (ref 25–45)
IRON SERPL-MCNC: 44 UG/DL (ref 35–150)
LDLC SERPL CALC-MCNC: 51 MG/DL
LYMPHOCYTES # BLD AUTO: 2.84 X10*3/UL (ref 1.2–4.8)
LYMPHOCYTES NFR BLD AUTO: 33.1 %
MCH RBC QN AUTO: 26.2 PG (ref 26–34)
MCHC RBC AUTO-ENTMCNC: 30.9 G/DL (ref 32–36)
MCV RBC AUTO: 85 FL (ref 80–100)
MICROALBUMIN UR-MCNC: 7.2 MG/L
MICROALBUMIN/CREAT UR: 5 UG/MG CREAT
MONOCYTES # BLD AUTO: 0.58 X10*3/UL (ref 0.1–1)
MONOCYTES NFR BLD AUTO: 6.8 %
NEUTROPHILS # BLD AUTO: 4.43 X10*3/UL (ref 1.2–7.7)
NEUTROPHILS NFR BLD AUTO: 51.7 %
NON HDL CHOLESTEROL: 80 MG/DL (ref 0–149)
NRBC BLD-RTO: 0 /100 WBCS (ref 0–0)
PLATELET # BLD AUTO: 399 X10*3/UL (ref 150–450)
POTASSIUM SERPL-SCNC: 4.2 MMOL/L (ref 3.5–5.3)
PROT SERPL-MCNC: 6.9 G/DL (ref 6.4–8.2)
RBC # BLD AUTO: 4.13 X10*6/UL (ref 4–5.2)
SODIUM SERPL-SCNC: 140 MMOL/L (ref 136–145)
TIBC SERPL-MCNC: 394 UG/DL (ref 240–445)
TRIGL SERPL-MCNC: 148 MG/DL (ref 0–149)
TSH SERPL-ACNC: 2.18 MIU/L (ref 0.44–3.98)
UIBC SERPL-MCNC: 350 UG/DL (ref 110–370)
VIT B12 SERPL-MCNC: 455 PG/ML (ref 211–911)
VLDL: 30 MG/DL (ref 0–40)
WBC # BLD AUTO: 8.6 X10*3/UL (ref 4.4–11.3)

## 2024-10-10 PROCEDURE — 82607 VITAMIN B-12: CPT

## 2024-10-10 PROCEDURE — 83036 HEMOGLOBIN GLYCOSYLATED A1C: CPT

## 2024-10-10 PROCEDURE — 83540 ASSAY OF IRON: CPT

## 2024-10-10 PROCEDURE — 82043 UR ALBUMIN QUANTITATIVE: CPT

## 2024-10-10 PROCEDURE — 82306 VITAMIN D 25 HYDROXY: CPT

## 2024-10-10 PROCEDURE — 82728 ASSAY OF FERRITIN: CPT

## 2024-10-10 PROCEDURE — 84443 ASSAY THYROID STIM HORMONE: CPT

## 2024-10-10 PROCEDURE — 36415 COLL VENOUS BLD VENIPUNCTURE: CPT

## 2024-10-10 PROCEDURE — 82746 ASSAY OF FOLIC ACID SERUM: CPT

## 2024-10-10 PROCEDURE — 82570 ASSAY OF URINE CREATININE: CPT

## 2024-10-10 PROCEDURE — 80053 COMPREHEN METABOLIC PANEL: CPT

## 2024-10-10 PROCEDURE — 82248 BILIRUBIN DIRECT: CPT

## 2024-10-10 PROCEDURE — 83550 IRON BINDING TEST: CPT

## 2024-10-10 PROCEDURE — 85025 COMPLETE CBC W/AUTO DIFF WBC: CPT

## 2024-10-10 PROCEDURE — 80061 LIPID PANEL: CPT

## 2024-11-04 PROCEDURE — RXMED WILLOW AMBULATORY MEDICATION CHARGE

## 2024-11-06 ENCOUNTER — PHARMACY VISIT (OUTPATIENT)
Dept: PHARMACY | Facility: CLINIC | Age: 59
End: 2024-11-06
Payer: COMMERCIAL

## 2024-11-23 DIAGNOSIS — I10 PRIMARY HYPERTENSION: ICD-10-CM

## 2024-11-25 RX ORDER — LISINOPRIL 10 MG/1
10 TABLET ORAL DAILY
Qty: 90 TABLET | Refills: 0 | Status: SHIPPED | OUTPATIENT
Start: 2024-11-25

## 2024-12-03 PROCEDURE — RXMED WILLOW AMBULATORY MEDICATION CHARGE

## 2024-12-05 ENCOUNTER — PHARMACY VISIT (OUTPATIENT)
Dept: PHARMACY | Facility: CLINIC | Age: 59
End: 2024-12-05
Payer: COMMERCIAL

## 2024-12-26 PROCEDURE — RXMED WILLOW AMBULATORY MEDICATION CHARGE

## 2025-01-03 ENCOUNTER — PHARMACY VISIT (OUTPATIENT)
Dept: PHARMACY | Facility: CLINIC | Age: 60
End: 2025-01-03
Payer: COMMERCIAL

## 2025-01-24 PROCEDURE — RXMED WILLOW AMBULATORY MEDICATION CHARGE

## 2025-01-27 ENCOUNTER — PHARMACY VISIT (OUTPATIENT)
Dept: PHARMACY | Facility: CLINIC | Age: 60
End: 2025-01-27
Payer: COMMERCIAL

## 2025-02-13 DIAGNOSIS — I10 PRIMARY HYPERTENSION: ICD-10-CM

## 2025-02-13 DIAGNOSIS — R32 URINARY INCONTINENCE, UNSPECIFIED TYPE: ICD-10-CM

## 2025-02-13 DIAGNOSIS — E78.2 MIXED HYPERLIPIDEMIA: ICD-10-CM

## 2025-02-13 RX ORDER — ATORVASTATIN CALCIUM 20 MG/1
20 TABLET, FILM COATED ORAL DAILY
Qty: 90 TABLET | Refills: 0 | Status: SHIPPED | OUTPATIENT
Start: 2025-02-13

## 2025-02-13 RX ORDER — OXYBUTYNIN CHLORIDE 10 MG/1
10 TABLET, EXTENDED RELEASE ORAL DAILY
Qty: 90 TABLET | Refills: 0 | Status: SHIPPED | OUTPATIENT
Start: 2025-02-13

## 2025-02-13 RX ORDER — LISINOPRIL 10 MG/1
10 TABLET ORAL DAILY
Qty: 90 TABLET | Refills: 3 | Status: SHIPPED | OUTPATIENT
Start: 2025-02-13

## 2025-02-21 PROCEDURE — RXMED WILLOW AMBULATORY MEDICATION CHARGE

## 2025-02-25 ENCOUNTER — PHARMACY VISIT (OUTPATIENT)
Dept: PHARMACY | Facility: CLINIC | Age: 60
End: 2025-02-25
Payer: COMMERCIAL

## 2025-03-19 NOTE — PROGRESS NOTES
HPI   60 yo with Diabetes 2 (dx 2007), HTN, Dyslipidemia, fatty liver, reflux. A1c-7.1% last visit, today 6%.      Pt is testing sugars 1 times per day. Pt is having low sugars 0 times/week. Pt's typical blood sugars are running 120s on waking, low 100's in the evening. Pt is following a carb controlled diet and knows reasonable carb allowances. Pt is able to afford their medications. Pt is not all that active, physical at work.          Taking metformin 1gram bid, anuj 30mg, mounjaro 10 mg(increased since last visit (trulicity in past) through  pap  -no longer on glimepiride     Taking atorvastatin 20mg for lipids and tolerating.      Taking lisinopril 10mg for htn.       Current Outpatient Medications:     atorvastatin (Lipitor) 20 mg tablet, TAKE ONE TABLET BY MOUTH DAILY, Disp: 90 tablet, Rfl: 0    blood sugar diagnostic strip, 1 x daily for 90 days, Disp: , Rfl:     cholecalciferol (Vitamin D-3) 25 MCG (1000 UT) tablet, Take 1 tablet (1,000 Units) by mouth once daily., Disp: , Rfl:     lancets misc, as directed As directed for 90 days, Disp: , Rfl:     lisinopril 10 mg tablet, TAKE ONE TABLET BY MOUTH DAILY, Disp: 90 tablet, Rfl: 3    metFORMIN (Glucophage) 1,000 mg tablet, Take 1 tablet (1,000 mg) by mouth 2 times a day with meals., Disp: 180 tablet, Rfl: 3    multivitamin with minerals iron-free (Centrum Silver), Take 1 tablet by mouth once daily., Disp: , Rfl:     omeprazole (PriLOSEC) 20 mg DR capsule, Take 1 capsule (20 mg) by mouth once daily. Do not crush or chew., Disp: 90 capsule, Rfl: 3    oxybutynin XL (Ditropan-XL) 10 mg 24 hr tablet, TAKE ONE TABLET BY MOUTH EVERY DAY, Disp: 90 tablet, Rfl: 0    pioglitazone (Actos) 30 mg tablet, Take 1 tablet (30 mg) by mouth once daily., Disp: 90 tablet, Rfl: 3    tirzepatide (Mounjaro) 10 mg/0.5 mL pen injector, Inject 10 mg under the skin 1 (one) time per week., Disp: 6 mL, Rfl: 3      Allergies as of 03/20/2025    (No Known Allergies)         Review of  "Systems   Cardiology: Lightheadedness-denies.  Chest pain-denies.  Leg edema-denies.  Palpitations-denies.  Respiratory: Cough-denies. Shortness of breath-denies.  Wheezing-denies.  Gastroenterology: Constipation-denies.  Diarrhea-denies.  Heartburn-denies.  Endocrinology: Cold intolerance-denies.  Heat intolerance-denies.  Sweats-denies.  Neurology: Headache-denies.  Tremor-denies.  Neuropathy in extremities-denies., +trigger finger  Psychology: Low energy-denies.  Irritability-denies.  Sleep disturbances-denies.      /61 (BP Location: Left arm, Patient Position: Sitting)   Pulse 75   Ht 1.549 m (5' 1\")   Wt 72.6 kg (160 lb)   BMI 30.23 kg/m²       Labs:  Lab Results   Component Value Date    WBC 8.6 10/10/2024    NRBC 0.0 10/10/2024    RBC 4.13 10/10/2024    HGB 10.8 (L) 10/10/2024    HCT 34.9 (L) 10/10/2024     10/10/2024     Lab Results   Component Value Date    CALCIUM 9.4 10/10/2024    AST 15 10/10/2024    ALKPHOS 59 10/10/2024    BILITOT 0.3 10/10/2024    PROT 6.9 10/10/2024    ALBUMIN 4.2 10/10/2024    GLOB 2.5 09/20/2023    AGR 1.8 09/20/2023     10/10/2024    K 4.2 10/10/2024     10/10/2024    CO2 27 10/10/2024    ANIONGAP 13 10/10/2024    BUN 12 10/10/2024    CREATININE 0.62 10/10/2024    UREACREAUR 15.0 09/20/2023    GLUCOSE 123 (H) 10/10/2024    ALT 14 10/10/2024    EGFR >90 10/10/2024     Lab Results   Component Value Date    CHOL 128 10/10/2024    TRIG 148 10/10/2024    HDL 47.9 10/10/2024    LDLCALC 51 10/10/2024     Lab Results   Component Value Date    MICROALBCREA 5.0 10/10/2024     Lab Results   Component Value Date    TSH 2.18 10/10/2024     Lab Results   Component Value Date    OGDBUQDV09 455 10/10/2024     Lab Results   Component Value Date    HGBA1C 6.0 03/20/2025         Physical Exam   General Appearance: pleasant, cooperative, no acute distress  HEENT: no chemosis, no proptosis, no lid lag, no lid retraction  Neck: no lymphadenopathy, no thyromegaly, no " dominant thyroid nodules  Heart: no murmurs, regular rate and rhythm, S1 and S2  Lungs: no wheezes, no rhonci, no rales  Extremities: no lower extremity swelling      Assessment/Plan   1. Type 2 diabetes mellitus with hyperglycemia, unspecified whether long term insulin use (Multi) (Primary)  -A1c ordered and reviewed  -glycemic values reviewed  -labs reviewed  -refills sent  -repeat labs pending today    -overall doing great, no change in mounjaro for now as still losing wt and at A1c target  -room to increase dosage if needed    -lower metformin from 1gram bid to metformin 500mgxr bid  (Consider stopping at next visit and lower nauj by 50% pending repeat A1c results)    2. Essential hypertension  -at target, low threshold to lower dosage as pt losing wt    3. Mixed hyperlipidemia  -on statin at target    4. Fatty liver  -wt loss/glp/lifestyle         Follow Up:  Mary 6 months    -labs/tests/notes reviewed  -reviewed and counseled patient on medication monitoring and side effects

## 2025-03-20 ENCOUNTER — APPOINTMENT (OUTPATIENT)
Dept: ENDOCRINOLOGY | Facility: CLINIC | Age: 60
End: 2025-03-20
Payer: MEDICARE

## 2025-03-20 VITALS
DIASTOLIC BLOOD PRESSURE: 61 MMHG | WEIGHT: 160 LBS | SYSTOLIC BLOOD PRESSURE: 106 MMHG | HEART RATE: 75 BPM | BODY MASS INDEX: 30.21 KG/M2 | HEIGHT: 61 IN

## 2025-03-20 DIAGNOSIS — K76.0 FATTY LIVER: ICD-10-CM

## 2025-03-20 DIAGNOSIS — E11.65 TYPE 2 DIABETES MELLITUS WITH HYPERGLYCEMIA, UNSPECIFIED WHETHER LONG TERM INSULIN USE (MULTI): Primary | ICD-10-CM

## 2025-03-20 DIAGNOSIS — E78.2 MIXED HYPERLIPIDEMIA: ICD-10-CM

## 2025-03-20 DIAGNOSIS — I10 ESSENTIAL HYPERTENSION: ICD-10-CM

## 2025-03-20 LAB
ALBUMIN SERPL-MCNC: 4.6 G/DL (ref 3.6–5.1)
ALBUMIN/GLOB SERPL: 1.7 (CALC) (ref 1–2.5)
ALP SERPL-CCNC: 68 U/L (ref 37–153)
ALT SERPL-CCNC: 13 U/L (ref 6–29)
ANION GAP SERPL CALCULATED.4IONS-SCNC: 14 MMOL/L (CALC) (ref 7–17)
AST SERPL-CCNC: 16 U/L (ref 10–35)
BASOPHILS # BLD AUTO: 40 CELLS/UL (ref 0–200)
BASOPHILS NFR BLD AUTO: 0.5 %
BILIRUB DIRECT SERPL-MCNC: 0.1 MG/DL
BILIRUB INDIRECT SERPL-MCNC: 0.3 MG/DL (CALC) (ref 0.2–1.2)
BILIRUB SERPL-MCNC: 0.4 MG/DL (ref 0.2–1.2)
BUN SERPL-MCNC: 9 MG/DL (ref 7–25)
BUN/CREAT SERPL: NORMAL (CALC) (ref 6–22)
CALCIUM SERPL-MCNC: 9.6 MG/DL (ref 8.6–10.4)
CHLORIDE SERPL-SCNC: 102 MMOL/L (ref 98–110)
CO2 SERPL-SCNC: 22 MMOL/L (ref 20–32)
CREAT SERPL-MCNC: 0.57 MG/DL (ref 0.5–1.03)
EGFRCR SERPLBLD CKD-EPI 2021: 106 ML/MIN/1.73M2
EOSINOPHIL # BLD AUTO: 320 CELLS/UL (ref 15–500)
EOSINOPHIL NFR BLD AUTO: 4 %
ERYTHROCYTE [DISTWIDTH] IN BLOOD BY AUTOMATED COUNT: 14.3 % (ref 11–15)
GLOBULIN SER CALC-MCNC: 2.7 G/DL (CALC) (ref 1.9–3.7)
GLUCOSE SERPL-MCNC: 94 MG/DL (ref 65–99)
HCT VFR BLD AUTO: 36.7 % (ref 35–45)
HGB BLD-MCNC: 11.6 G/DL (ref 11.7–15.5)
LYMPHOCYTES # BLD AUTO: 2272 CELLS/UL (ref 850–3900)
LYMPHOCYTES NFR BLD AUTO: 28.4 %
MCH RBC QN AUTO: 25.4 PG (ref 27–33)
MCHC RBC AUTO-ENTMCNC: 31.6 G/DL (ref 32–36)
MCV RBC AUTO: 80.5 FL (ref 80–100)
MONOCYTES # BLD AUTO: 528 CELLS/UL (ref 200–950)
MONOCYTES NFR BLD AUTO: 6.6 %
NEUTROPHILS # BLD AUTO: 4840 CELLS/UL (ref 1500–7800)
NEUTROPHILS NFR BLD AUTO: 60.5 %
PLATELET # BLD AUTO: 384 THOUSAND/UL (ref 140–400)
PMV BLD REES-ECKER: 9.1 FL (ref 7.5–12.5)
POC HEMOGLOBIN A1C: 6 % (ref 4.2–6.5)
POTASSIUM SERPL-SCNC: 4.2 MMOL/L (ref 3.5–5.3)
PROT SERPL-MCNC: 7.3 G/DL (ref 6.1–8.1)
RBC # BLD AUTO: 4.56 MILLION/UL (ref 3.8–5.1)
SODIUM SERPL-SCNC: 138 MMOL/L (ref 135–146)
WBC # BLD AUTO: 8 THOUSAND/UL (ref 3.8–10.8)

## 2025-03-20 PROCEDURE — 83036 HEMOGLOBIN GLYCOSYLATED A1C: CPT | Performed by: INTERNAL MEDICINE

## 2025-03-20 PROCEDURE — 4010F ACE/ARB THERAPY RXD/TAKEN: CPT | Performed by: INTERNAL MEDICINE

## 2025-03-20 PROCEDURE — 99214 OFFICE O/P EST MOD 30 MIN: CPT | Performed by: INTERNAL MEDICINE

## 2025-03-20 PROCEDURE — 1036F TOBACCO NON-USER: CPT | Performed by: INTERNAL MEDICINE

## 2025-03-20 PROCEDURE — 3074F SYST BP LT 130 MM HG: CPT | Performed by: INTERNAL MEDICINE

## 2025-03-20 PROCEDURE — 3078F DIAST BP <80 MM HG: CPT | Performed by: INTERNAL MEDICINE

## 2025-03-20 PROCEDURE — 3008F BODY MASS INDEX DOCD: CPT | Performed by: INTERNAL MEDICINE

## 2025-03-20 RX ORDER — METFORMIN HYDROCHLORIDE 500 MG/1
TABLET, EXTENDED RELEASE ORAL
Qty: 180 TABLET | Refills: 3 | Status: SHIPPED | OUTPATIENT
Start: 2025-03-20

## 2025-03-20 ASSESSMENT — PAIN SCALES - GENERAL: PAINLEVEL_OUTOF10: 4

## 2025-03-21 PROCEDURE — RXMED WILLOW AMBULATORY MEDICATION CHARGE

## 2025-03-27 ENCOUNTER — PHARMACY VISIT (OUTPATIENT)
Dept: PHARMACY | Facility: CLINIC | Age: 60
End: 2025-03-27
Payer: COMMERCIAL

## 2025-04-09 ENCOUNTER — OFFICE VISIT (OUTPATIENT)
Dept: PRIMARY CARE | Facility: CLINIC | Age: 60
End: 2025-04-09
Payer: MEDICARE

## 2025-04-09 VITALS
BODY MASS INDEX: 29.83 KG/M2 | WEIGHT: 158 LBS | DIASTOLIC BLOOD PRESSURE: 60 MMHG | HEIGHT: 61 IN | OXYGEN SATURATION: 98 % | HEART RATE: 94 BPM | SYSTOLIC BLOOD PRESSURE: 128 MMHG

## 2025-04-09 DIAGNOSIS — M54.50 LOW BACK PAIN, UNSPECIFIED BACK PAIN LATERALITY, UNSPECIFIED CHRONICITY, UNSPECIFIED WHETHER SCIATICA PRESENT: ICD-10-CM

## 2025-04-09 DIAGNOSIS — K21.9 GASTROESOPHAGEAL REFLUX DISEASE WITHOUT ESOPHAGITIS: ICD-10-CM

## 2025-04-09 DIAGNOSIS — Z01.89 ENCOUNTER FOR ROUTINE LABORATORY TESTING: ICD-10-CM

## 2025-04-09 DIAGNOSIS — I10 PRIMARY HYPERTENSION: ICD-10-CM

## 2025-04-09 DIAGNOSIS — E11.69 TYPE 2 DIABETES MELLITUS WITH OTHER SPECIFIED COMPLICATION, WITHOUT LONG-TERM CURRENT USE OF INSULIN: Primary | ICD-10-CM

## 2025-04-09 DIAGNOSIS — E55.9 VITAMIN D DEFICIENCY: ICD-10-CM

## 2025-04-09 DIAGNOSIS — K76.0 NAFLD (NONALCOHOLIC FATTY LIVER DISEASE): ICD-10-CM

## 2025-04-09 DIAGNOSIS — M15.0 PRIMARY OSTEOARTHRITIS INVOLVING MULTIPLE JOINTS: ICD-10-CM

## 2025-04-09 DIAGNOSIS — E78.2 MIXED HYPERLIPIDEMIA: ICD-10-CM

## 2025-04-09 DIAGNOSIS — Z00.00 ANNUAL PHYSICAL EXAM: ICD-10-CM

## 2025-04-09 DIAGNOSIS — R32 URINARY INCONTINENCE, UNSPECIFIED TYPE: ICD-10-CM

## 2025-04-09 DIAGNOSIS — D64.9 ANEMIA, UNSPECIFIED TYPE: ICD-10-CM

## 2025-04-09 PROBLEM — E66.9 OBESITY: Status: RESOLVED | Noted: 2023-09-07 | Resolved: 2025-04-09

## 2025-04-09 PROCEDURE — 4010F ACE/ARB THERAPY RXD/TAKEN: CPT | Performed by: STUDENT IN AN ORGANIZED HEALTH CARE EDUCATION/TRAINING PROGRAM

## 2025-04-09 PROCEDURE — 3008F BODY MASS INDEX DOCD: CPT | Performed by: STUDENT IN AN ORGANIZED HEALTH CARE EDUCATION/TRAINING PROGRAM

## 2025-04-09 PROCEDURE — 3044F HG A1C LEVEL LT 7.0%: CPT | Performed by: STUDENT IN AN ORGANIZED HEALTH CARE EDUCATION/TRAINING PROGRAM

## 2025-04-09 PROCEDURE — 99214 OFFICE O/P EST MOD 30 MIN: CPT | Performed by: STUDENT IN AN ORGANIZED HEALTH CARE EDUCATION/TRAINING PROGRAM

## 2025-04-09 PROCEDURE — 3078F DIAST BP <80 MM HG: CPT | Performed by: STUDENT IN AN ORGANIZED HEALTH CARE EDUCATION/TRAINING PROGRAM

## 2025-04-09 PROCEDURE — 3074F SYST BP LT 130 MM HG: CPT | Performed by: STUDENT IN AN ORGANIZED HEALTH CARE EDUCATION/TRAINING PROGRAM

## 2025-04-09 PROCEDURE — 1036F TOBACCO NON-USER: CPT | Performed by: STUDENT IN AN ORGANIZED HEALTH CARE EDUCATION/TRAINING PROGRAM

## 2025-04-09 RX ORDER — OMEPRAZOLE 20 MG/1
20 CAPSULE, DELAYED RELEASE ORAL DAILY
Qty: 90 CAPSULE | Refills: 3 | Status: SHIPPED | OUTPATIENT
Start: 2025-04-09 | End: 2026-04-09

## 2025-04-09 RX ORDER — METFORMIN HYDROCHLORIDE 500 MG/1
500 TABLET, EXTENDED RELEASE ORAL
Status: SHIPPED
Start: 2025-04-09

## 2025-04-09 RX ORDER — ATORVASTATIN CALCIUM 20 MG/1
20 TABLET, FILM COATED ORAL DAILY
Qty: 90 TABLET | Refills: 3 | Status: SHIPPED | OUTPATIENT
Start: 2025-04-09 | End: 2026-04-09

## 2025-04-09 ASSESSMENT — PATIENT HEALTH QUESTIONNAIRE - PHQ9
1. LITTLE INTEREST OR PLEASURE IN DOING THINGS: NOT AT ALL
2. FEELING DOWN, DEPRESSED OR HOPELESS: NOT AT ALL
SUM OF ALL RESPONSES TO PHQ9 QUESTIONS 1 AND 2: 0

## 2025-04-09 ASSESSMENT — ENCOUNTER SYMPTOMS
RESPIRATORY NEGATIVE: 1
CARDIOVASCULAR NEGATIVE: 1
GASTROINTESTINAL NEGATIVE: 1
CONSTITUTIONAL NEGATIVE: 1

## 2025-04-09 ASSESSMENT — PAIN SCALES - GENERAL: PAINLEVEL_OUTOF10: 0-NO PAIN

## 2025-04-09 NOTE — PROGRESS NOTES
[facility]: [facility]  PROGRESS NOTE      Sandra Bradshaw is a 59 y.o. female that is presenting today for Follow-up.    Assessment/Plan   - Overall, the patient feels well and denies any acute symptoms / concerns at this time.  - Blood pressure at goal today.  - Encouraged continued dietary, exercise, and lifestyle modification. Notable that the patient has done exceptionally well with weight loss through both habit changes and mounjaro.  - Significant medication and problem list reconciliation done today.     - Labwork:   - Patient had labwork done for this appointment. Discussed today. Everything looked great. Mild anemia, improved compared to prior.  - Will order labwork for the patient's next appointment. Encouraged the patient to get this labwork done one week prior to the next appointment.    Diagnoses and all orders for this visit:  Type 2 diabetes mellitus with other specified complication, without long-term current use of insulin  -     Follow Up In Primary Care  -     metFORMIN XR (Glucophage-XR) 500 mg 24 hr tablet; Take 1 tablet (500 mg) by mouth 2 times daily (morning and late afternoon).  -     Hemoglobin A1C; Future  -     TSH with reflex to Free T4 if abnormal; Future  -     Albumin-Creatinine Ratio, Urine Random; Future  Gastroesophageal reflux disease without esophagitis  -     Follow Up In Primary Care  -     omeprazole (PriLOSEC) 20 mg DR capsule; Take 1 capsule (20 mg) by mouth once daily. Do not crush or chew.  Primary osteoarthritis involving multiple joints  -     Follow Up In Primary Care  NAFLD (nonalcoholic fatty liver disease)  -     Follow Up In Primary Care  -     Hepatic Function Panel; Future  Urinary incontinence, unspecified type  -     Follow Up In Primary Care  Anemia, unspecified type  -     Follow Up In Primary Care  -     CBC and Auto Differential; Future  Mixed hyperlipidemia  -     Follow Up In Primary Care  -     atorvastatin (Lipitor) 20 mg tablet;  Take 1 tablet (20 mg) by mouth once daily.  -     Lipid Panel; Future  Primary hypertension  -     Follow Up In Primary Care  -     Basic Metabolic Panel; Future  Vitamin D deficiency  -     Follow Up In Primary Care  -     Vitamin D 25-Hydroxy,Total (for eval of Vitamin D levels); Future  Encounter for routine laboratory testing  Annual physical exam  -     Follow Up In Primary Care; Future    Current Outpatient Medications   Medication Instructions    atorvastatin (LIPITOR) 20 mg, oral, Daily    blood sugar diagnostic strip 1 x daily for 90 days    cholecalciferol (VITAMIN D-3) 1,000 Units, oral, Daily    lancets misc as directed As directed for 90 days    lisinopril 10 mg, oral, Daily    metFORMIN XR (GLUCOPHAGE-XR) 500 mg, oral, 2 times daily (morning and late afternoon)    Mounjaro 10 mg, subcutaneous, Once Weekly    multivitamin with minerals iron-free (Centrum Silver) 1 tablet, oral, Daily    omeprazole (PRILOSEC) 20 mg, oral, Daily, Do not crush or chew.    oxybutynin XL (DITROPAN-XL) 10 mg, oral, Daily    pioglitazone (ACTOS) 30 mg, oral, Daily     Subjective   - The patient otherwise feels well and denies any acute symptoms or concerns at this time.  - The patient denies any changes or progression of their chronic medical problems.  - The patient denies any problems or concerns with their medications.      Review of Systems   Constitutional: Negative.    Respiratory: Negative.     Cardiovascular: Negative.    Gastrointestinal: Negative.    All other systems reviewed and are negative.     Objective   Vitals:    04/09/25 1005   BP: 128/60   Pulse: 94   SpO2: 98%      Body mass index is 29.87 kg/m².  Physical Exam  Vitals and nursing note reviewed.   Constitutional:       General: She is not in acute distress.  Neck:      Vascular: No carotid bruit.   Cardiovascular:      Rate and Rhythm: Normal rate and regular rhythm.      Heart sounds: Normal heart sounds.   Pulmonary:      Effort: Pulmonary effort is  "normal.      Breath sounds: Normal breath sounds.   Musculoskeletal:         General: No swelling.   Neurological:      Mental Status: She is alert. Mental status is at baseline.   Psychiatric:         Mood and Affect: Mood normal.       Diagnostic Results   Lab Results   Component Value Date    GLUCOSE 94 03/20/2025    CALCIUM 9.6 03/20/2025     03/20/2025    K 4.2 03/20/2025    CO2 22 03/20/2025     03/20/2025    BUN 9 03/20/2025    CREATININE 0.57 03/20/2025     Lab Results   Component Value Date    ALT 13 03/20/2025    AST 16 03/20/2025    ALKPHOS 68 03/20/2025    BILITOT 0.4 03/20/2025     Lab Results   Component Value Date    WBC 8.0 03/20/2025    HGB 11.6 (L) 03/20/2025    HCT 36.7 03/20/2025    MCV 80.5 03/20/2025     03/20/2025     Lab Results   Component Value Date    CHOL 128 10/10/2024    CHOL 138 09/20/2023    CHOL 122 (L) 03/17/2022     Lab Results   Component Value Date    HDL 47.9 10/10/2024    HDL 55 09/20/2023    HDL 39 (L) 03/17/2022     Lab Results   Component Value Date    LDLCALC 51 10/10/2024    LDLCALC 51 (L) 09/20/2023    LDLCALC 56 (L) 03/17/2022     Lab Results   Component Value Date    TRIG 148 10/10/2024    TRIG 162 (H) 09/20/2023    TRIG 135 03/17/2022     No components found for: \"CHOLHDL\"  Lab Results   Component Value Date    HGBA1C 6.0 03/20/2025     Other labs not included in the list above were reviewed either before or during this encounter.    History    Past Medical History:   Diagnosis Date    Essential hypertension     Mixed hyperlipidemia     Personal history of other diseases of the circulatory system     History of pulmonary hypertension    Personal history of other specified conditions     History of urinary frequency    Type 2 diabetes mellitus with hyperglycemia, unspecified whether long term insulin use (Multi)     Type 2 diabetes mellitus without complications     Type 2 diabetes mellitus without complication     Past Surgical History:   Procedure " Laterality Date    CERVICAL DISC SURGERY      OTHER SURGICAL HISTORY  2020     section    OTHER SURGICAL HISTORY  2020    Cholecystectomy    OTHER SURGICAL HISTORY  2008    corpectomy,partial C5-6    TUBAL LIGATION       Family History   Problem Relation Name Age of Onset    Diabetes Mother      Heart disease Mother      Diabetes Father      Hypertension Father      Stroke Father      Stomach cancer Paternal Grandmother       Social History     Socioeconomic History    Marital status:      Spouse name: Not on file    Number of children: Not on file    Years of education: Not on file    Highest education level: Not on file   Occupational History    Not on file   Tobacco Use    Smoking status: Never     Passive exposure: Never    Smokeless tobacco: Never   Vaping Use    Vaping status: Never Used   Substance and Sexual Activity    Alcohol use: Yes     Comment: occasionally    Drug use: Never    Sexual activity: Not Currently     Partners: Male     Birth control/protection: Surgical   Other Topics Concern    Not on file   Social History Narrative    Not on file     Social Drivers of Health     Financial Resource Strain: Not on file   Food Insecurity: Not on file   Transportation Needs: Not on file   Physical Activity: Not on file   Stress: Not on file   Social Connections: Not on file   Intimate Partner Violence: Not on file   Housing Stability: Not on file     No Known Allergies  Current Outpatient Medications on File Prior to Visit   Medication Sig Dispense Refill    blood sugar diagnostic strip 1 x daily for 90 days      cholecalciferol (Vitamin D-3) 25 MCG (1000 UT) tablet Take 1 tablet (1,000 Units) by mouth once daily.      lancets misc as directed As directed for 90 days      lisinopril 10 mg tablet TAKE ONE TABLET BY MOUTH DAILY 90 tablet 3    multivitamin with minerals iron-free (Centrum Silver) Take 1 tablet by mouth once daily.      oxybutynin XL (Ditropan-XL) 10 mg 24 hr tablet  TAKE ONE TABLET BY MOUTH EVERY DAY 90 tablet 0    pioglitazone (Actos) 30 mg tablet Take 1 tablet (30 mg) by mouth once daily. 90 tablet 3    tirzepatide (Mounjaro) 10 mg/0.5 mL pen injector Inject 10 mg under the skin 1 (one) time per week. 6 mL 3    [DISCONTINUED] atorvastatin (Lipitor) 20 mg tablet TAKE ONE TABLET BY MOUTH DAILY 90 tablet 0    [DISCONTINUED] metFORMIN XR (Glucophage-XR) 500 mg 24 hr tablet Use bid Do not crush, chew, or split. (Patient taking differently: 1 tablet (500 mg) 2 times daily (morning and late afternoon). Use bid Do not crush, chew, or split.) 180 tablet 3    [DISCONTINUED] omeprazole (PriLOSEC) 20 mg DR capsule Take 1 capsule (20 mg) by mouth once daily. Do not crush or chew. 90 capsule 3     No current facility-administered medications on file prior to visit.     Immunization History   Administered Date(s) Administered    COVID-19, mRNA, LNP-S, PF, 30 mcg/0.3 mL dose 01/23/2021, 02/13/2021, 11/27/2021    Flu vaccine (IIV4), preservative free *Check age/dose* 10/01/2020    Flu vaccine, quadrivalent, no egg protein, age 6 month or greater (FLUCELVAX) 12/28/2021, 09/21/2022, 10/01/2023    Flu vaccine, trivalent, preservative free, age 6 months and greater (Fluarix/Fluzone/Flulaval) 10/02/2024    Influenza, Unspecified 09/21/2022    Influenza, injectable, quadrivalent 10/09/2015, 12/30/2016, 10/27/2017, 10/23/2018, 11/06/2019    Influenza, seasonal, injectable 11/30/2010, 10/22/2013, 09/19/2014    Novel influenza-H1N1-09, preservative-free 12/13/2009    PPD Test 10/22/2013, 10/27/2017, 10/23/2018    Pfizer COVID-19 vaccine, 12 years and older, (30mcg/0.3mL) (Comirnaty) 10/01/2023    Tdap vaccine, age 7 year and older (BOOSTRIX, ADACEL) 09/21/2023    Zoster vaccine, recombinant, adult (SHINGRIX) 08/21/2021, 04/18/2022     Patient's medical history was reviewed and updated either before or during this encounter.       Jerson Shahid MD

## 2025-04-09 NOTE — PATIENT INSTRUCTIONS
- Overall, the patient feels well and denies any acute symptoms / concerns at this time.  - Blood pressure at goal today.  - Encouraged continued dietary, exercise, and lifestyle modification. Notable that the patient has done exceptionally well with weight loss through both habit changes and mounjaro.  - Significant medication and problem list reconciliation done today.     - Labwork:   - Patient had labwork done for this appointment. Discussed today. Everything looked great. Mild anemia, improved compared to prior.  - Will order labwork for the patient's next appointment. Encouraged the patient to get this labwork done one week prior to the next appointment.

## 2025-04-15 DIAGNOSIS — Z79.4 TYPE 2 DIABETES MELLITUS WITH OTHER SPECIFIED COMPLICATION, WITH LONG-TERM CURRENT USE OF INSULIN: ICD-10-CM

## 2025-04-15 DIAGNOSIS — E11.69 TYPE 2 DIABETES MELLITUS WITH OTHER SPECIFIED COMPLICATION, WITH LONG-TERM CURRENT USE OF INSULIN: ICD-10-CM

## 2025-04-15 RX ORDER — PIOGLITAZONE 30 MG/1
30 TABLET ORAL DAILY
Qty: 90 TABLET | Refills: 0 | Status: SHIPPED | OUTPATIENT
Start: 2025-04-15

## 2025-04-17 PROCEDURE — RXMED WILLOW AMBULATORY MEDICATION CHARGE

## 2025-04-18 ENCOUNTER — PHARMACY VISIT (OUTPATIENT)
Dept: PHARMACY | Facility: CLINIC | Age: 60
End: 2025-04-18
Payer: COMMERCIAL

## 2025-05-15 PROCEDURE — RXMED WILLOW AMBULATORY MEDICATION CHARGE

## 2025-05-17 ENCOUNTER — PHARMACY VISIT (OUTPATIENT)
Dept: PHARMACY | Facility: CLINIC | Age: 60
End: 2025-05-17
Payer: COMMERCIAL

## 2025-06-04 ENCOUNTER — EVALUATION (OUTPATIENT)
Dept: PHYSICAL THERAPY | Facility: CLINIC | Age: 60
End: 2025-06-04
Payer: MEDICARE

## 2025-06-04 DIAGNOSIS — M54.16 LUMBAR BACK PAIN WITH RADICULOPATHY AFFECTING RIGHT LOWER EXTREMITY: Primary | ICD-10-CM

## 2025-06-04 DIAGNOSIS — M54.50 LOW BACK PAIN, UNSPECIFIED BACK PAIN LATERALITY, UNSPECIFIED CHRONICITY, UNSPECIFIED WHETHER SCIATICA PRESENT: ICD-10-CM

## 2025-06-04 PROCEDURE — 97162 PT EVAL MOD COMPLEX 30 MIN: CPT | Mod: GP | Performed by: PHYSICAL THERAPIST

## 2025-06-04 ASSESSMENT — ENCOUNTER SYMPTOMS
LOSS OF SENSATION IN FEET: 1
OCCASIONAL FEELINGS OF UNSTEADINESS: 1
DEPRESSION: 0

## 2025-06-10 ENCOUNTER — OFFICE VISIT (OUTPATIENT)
Dept: ORTHOPEDIC SURGERY | Facility: CLINIC | Age: 60
End: 2025-06-10
Payer: MEDICARE

## 2025-06-10 DIAGNOSIS — M65.331 TRIGGER FINGER, RIGHT MIDDLE FINGER: ICD-10-CM

## 2025-06-10 DIAGNOSIS — M65.332 TRIGGER FINGER, LEFT MIDDLE FINGER: Primary | ICD-10-CM

## 2025-06-10 PROCEDURE — 4010F ACE/ARB THERAPY RXD/TAKEN: CPT | Performed by: ORTHOPAEDIC SURGERY

## 2025-06-10 PROCEDURE — 3044F HG A1C LEVEL LT 7.0%: CPT | Performed by: ORTHOPAEDIC SURGERY

## 2025-06-10 PROCEDURE — 1036F TOBACCO NON-USER: CPT | Performed by: ORTHOPAEDIC SURGERY

## 2025-06-10 PROCEDURE — 99213 OFFICE O/P EST LOW 20 MIN: CPT | Performed by: ORTHOPAEDIC SURGERY

## 2025-06-10 PROCEDURE — 76942 ECHO GUIDE FOR BIOPSY: CPT | Performed by: ORTHOPAEDIC SURGERY

## 2025-06-10 PROCEDURE — 20550 NJX 1 TENDON SHEATH/LIGAMENT: CPT | Mod: RT | Performed by: ORTHOPAEDIC SURGERY

## 2025-06-10 PROCEDURE — 99203 OFFICE O/P NEW LOW 30 MIN: CPT | Mod: 25 | Performed by: ORTHOPAEDIC SURGERY

## 2025-06-10 PROCEDURE — 20550 NJX 1 TENDON SHEATH/LIGAMENT: CPT | Mod: LT | Performed by: ORTHOPAEDIC SURGERY

## 2025-06-10 PROCEDURE — G8433 SCR FOR DEP NOT CPT DOC RSN: HCPCS | Performed by: ORTHOPAEDIC SURGERY

## 2025-06-10 PROCEDURE — 2500000004 HC RX 250 GENERAL PHARMACY W/ HCPCS (ALT 636 FOR OP/ED): Performed by: ORTHOPAEDIC SURGERY

## 2025-06-10 RX ADMIN — METHYLPREDNISOLONE ACETATE 20 MG: 40 INJECTION, SUSPENSION INTRA-ARTICULAR; INTRALESIONAL; INTRAMUSCULAR; SOFT TISSUE at 10:15

## 2025-06-10 RX ADMIN — LIDOCAINE HYDROCHLORIDE 1 ML: 10 INJECTION, SOLUTION INFILTRATION; PERINEURAL at 10:15

## 2025-06-10 ASSESSMENT — ENCOUNTER SYMPTOMS
FEVER: 0
SHORTNESS OF BREATH: 0
ARTHRALGIAS: 1
SINUS PAIN: 0
COLOR CHANGE: 0
JOINT SWELLING: 1
CHILLS: 0
TROUBLE SWALLOWING: 0
WHEEZING: 0
EYE DISCHARGE: 0

## 2025-06-10 ASSESSMENT — PAIN SCALES - GENERAL: PAINLEVEL_OUTOF10: 4

## 2025-06-10 ASSESSMENT — PAIN - FUNCTIONAL ASSESSMENT: PAIN_FUNCTIONAL_ASSESSMENT: 0-10

## 2025-06-10 NOTE — PROGRESS NOTES
Reason for Appointment  Chief Complaint   Patient presents with    Right Middle Finger - Trigger Finger    Left Middle Finger - Trigger Finger     History of Present Illness  Patient is a 59 y.o. female here today for evaluation of bilateral long trigger fingers.  We have not seen her since April 2024, she had bilateral long trigger injections that did give her good relief up until about a month ago.  Pain has returned, she has pain with heavy gripping and the fingers are locking down on her at times.  She would like repeat injections today.  No other changes in her past medical history, allergies, or medications.    Medical History[1]    Surgical History[2]    Medication Documentation Review Audit       Reviewed by Genevieve Mcguire MA (Medical Assistant) on 06/10/25 at 1020      Medication Order Taking? Sig Documenting Provider Last Dose Status   atorvastatin (Lipitor) 20 mg tablet [id number] Yes Take 1 tablet (20 mg) by mouth once daily. Jerson Shahid MD  Active   blood sugar diagnostic strip [id number] Yes 1 x daily for 90 days Historical Provider, MD Taking Active   cholecalciferol (Vitamin D-3) 25 MCG (1000 UT) tablet [id number] Yes Take 1 tablet (1,000 Units) by mouth once daily. Jerson Shahid MD Taking Active   lancets misc [id number] Yes as directed As directed for 90 days Historical Provider, MD Taking Active   lisinopril 10 mg tablet [id number] Yes TAKE ONE TABLET BY MOUTH DAILY Jerson Shahid MD  Active   metFORMIN XR (Glucophage-XR) 500 mg 24 hr tablet [id number] Yes Take 1 tablet (500 mg) by mouth 2 times daily (morning and late afternoon). Jerson Shahid MD  Active   multivitamin with minerals iron-free (Centrum Silver) [id number] Yes Take 1 tablet by mouth once daily. Jerson Shahid MD Taking Active   omeprazole (PriLOSEC) 20 mg DR capsule [id number] Yes Take 1 capsule (20 mg) by mouth once daily. Do not crush or chew. Jerson Shahid MD  Active   oxybutynin XL (Ditropan-XL) 10 mg 24 hr  tablet [id number]  TAKE ONE TABLET BY MOUTH EVERY DAY Jerson Shahid MD  Active   pioglitazone (Actos) 30 mg tablet [id number] Yes TAKE ONE TABLET BY MOUTH ONCE DAILY Jerson Shahid MD  Active   tirzepatide (Mounjaro) 10 mg/0.5 mL pen injector [id number] Yes Inject 10 mg under the skin 1 (one) time per week. Rito Hernandez MD Taking Active                    RX Allergies[3]    Review of Systems   Constitutional:  Negative for chills and fever.   HENT:  Negative for mouth sores, sinus pain and trouble swallowing.    Eyes:  Negative for discharge.   Respiratory:  Negative for shortness of breath and wheezing.    Cardiovascular:  Negative for chest pain.   Musculoskeletal:  Positive for arthralgias and joint swelling.   Skin:  Negative for color change and pallor.   All other systems reviewed and are negative.    Exam   On exam bilateral hands show mild DJD but she does have some swelling over the A1 pulley tendon sheaths.  Tender over bilateral long A1 pulley tendon sheath, no active triggering of the other digits.  Good pulses and sensation in the upper extremities.    Assessment   Bilateral long trigger fingers    Plan   She would like repeat injections today.  We sterilely injected under ultrasound guidance Depo-Medrol and lidocaine into bilateral long A1 pulley tendon sheaths.  Patient understands the small risk of infection and the signs to look for as well as flare action.  We did discuss possible surgical release in the future if her symptoms return.  Hopefully these give her good relief, she can follow-up with us as needed.   Patient ID: Sandra Bradshaw is a 59 y.o. female.    Hand / UE Inj/Asp: R long A1 for trigger finger on 6/10/2025 10:15 AM  Indications: pain  Details: 25 G needle, ultrasound-guided  Medications: 1 mL lidocaine 10 mg/mL (1 %); 20 mg methylPREDNISolone acetate 40 mg/mL  Outcome: tolerated well, no immediate complications    After discussing the risks and benefits of the procedure we  proceeded with the injection. Under ultrasound guidance we identified the metacarpal bone and overlying tendon sheath with the FDS and FDP tendons, images obtained and saved. We then sterilely injected the right long finger A1 pulley with a mixture of 20 mg of DepoMedrol and .5 cc of 1% lidocaine. Pt tolerated the procedure well without any adverse reactions.    Procedure, treatment alternatives, risks and benefits explained, specific risks discussed. Consent was given by the patient. Immediately prior to procedure a time out was called to verify the correct patient, procedure, equipment, support staff and site/side marked as required. Patient was prepped and draped in the usual sterile fashion.       Hand / UE Inj/Asp: L long A1 for trigger finger on 6/10/2025 10:15 AM  Indications: pain  Details: 25 G needle, ultrasound-guided  Medications: 1 mL lidocaine 10 mg/mL (1 %); 20 mg methylPREDNISolone acetate 40 mg/mL  Outcome: tolerated well, no immediate complications    After discussing the risks and benefits of the procedure we proceeded with the injection. Under ultrasound guidance we identified the metacarpal bone and overlying tendon sheath with the FDS and FDP tendons, images obtained and saved. We then sterilely injected the left long finger A1 pulley with a mixture of 20 mg of DepoMedrol and .5 cc of 1% lidocaine. Pt tolerated the procedure well without any adverse reactions.    Procedure, treatment alternatives, risks and benefits explained, specific risks discussed. Consent was given by the patient. Immediately prior to procedure a time out was called to verify the correct patient, procedure, equipment, support staff and site/side marked as required. Patient was prepped and draped in the usual sterile fashion.         Maryam GODOY PA-C, am acting as a scribe and attest that this documentation has been prepared under the direction and in the presence of Matthew Nesbitt MD.    By signing below, Matthew GODOY  MD Laz, personally performed the services described in this documentation. All medical record entries made by the scribe were at my direction and in my presence. I have reviewed the chart and agree that the record reflects my personal performance and is accurate and complete.               [1]   Past Medical History:  Diagnosis Date    Essential hypertension     Mixed hyperlipidemia     Personal history of other diseases of the circulatory system     History of pulmonary hypertension    Personal history of other specified conditions     History of urinary frequency    Type 2 diabetes mellitus with hyperglycemia, unspecified whether long term insulin use (Multi)     Type 2 diabetes mellitus without complications     Type 2 diabetes mellitus without complication   [2]   Past Surgical History:  Procedure Laterality Date    CERVICAL DISC SURGERY      OTHER SURGICAL HISTORY  2020     section    OTHER SURGICAL HISTORY  2020    Cholecystectomy    OTHER SURGICAL HISTORY  2008    corpectomy,partial C5-6    TUBAL LIGATION     [3] No Known Allergies

## 2025-06-12 ENCOUNTER — TELEPHONE (OUTPATIENT)
Facility: CLINIC | Age: 60
End: 2025-06-12
Payer: MEDICARE

## 2025-06-12 ENCOUNTER — TELEPHONE (OUTPATIENT)
Dept: PRIMARY CARE | Facility: CLINIC | Age: 60
End: 2025-06-12
Payer: MEDICARE

## 2025-06-12 ENCOUNTER — HOSPITAL ENCOUNTER (OUTPATIENT)
Dept: RADIOLOGY | Facility: CLINIC | Age: 60
Discharge: HOME | End: 2025-06-12
Payer: MEDICARE

## 2025-06-12 VITALS — HEIGHT: 61 IN | BODY MASS INDEX: 29.83 KG/M2 | WEIGHT: 158 LBS

## 2025-06-12 DIAGNOSIS — Z12.31 ENCOUNTER FOR SCREENING MAMMOGRAM FOR BREAST CANCER: ICD-10-CM

## 2025-06-12 PROCEDURE — 77063 BREAST TOMOSYNTHESIS BI: CPT | Performed by: RADIOLOGY

## 2025-06-12 PROCEDURE — 77067 SCR MAMMO BI INCL CAD: CPT

## 2025-06-12 PROCEDURE — 77067 SCR MAMMO BI INCL CAD: CPT | Performed by: RADIOLOGY

## 2025-06-12 RX ORDER — METHYLPREDNISOLONE ACETATE 40 MG/ML
20 INJECTION, SUSPENSION INTRA-ARTICULAR; INTRALESIONAL; INTRAMUSCULAR; SOFT TISSUE
Status: COMPLETED | OUTPATIENT
Start: 2025-06-10 | End: 2025-06-10

## 2025-06-12 RX ORDER — LIDOCAINE HYDROCHLORIDE 10 MG/ML
1 INJECTION, SOLUTION INFILTRATION; PERINEURAL
Status: COMPLETED | OUTPATIENT
Start: 2025-06-10 | End: 2025-06-10

## 2025-06-12 NOTE — TELEPHONE ENCOUNTER
Pt c/o dizzy spells when bending over. Would like to discontinue lisinopril to see if it makes a difference.

## 2025-06-12 NOTE — TELEPHONE ENCOUNTER
Patient said she received a call from [employer] and she needs to reapply please call patient

## 2025-06-13 ENCOUNTER — CLINICAL SUPPORT (OUTPATIENT)
Dept: PRIMARY CARE | Facility: CLINIC | Age: 60
End: 2025-06-13
Payer: MEDICARE

## 2025-06-13 VITALS — SYSTOLIC BLOOD PRESSURE: 118 MMHG | HEART RATE: 86 BPM | DIASTOLIC BLOOD PRESSURE: 70 MMHG | OXYGEN SATURATION: 99 %

## 2025-06-16 ENCOUNTER — TELEMEDICINE (OUTPATIENT)
Dept: PHARMACY | Facility: HOSPITAL | Age: 60
End: 2025-06-16
Payer: MEDICARE

## 2025-06-16 DIAGNOSIS — E11.65 TYPE 2 DIABETES MELLITUS WITH HYPERGLYCEMIA, UNSPECIFIED WHETHER LONG TERM INSULIN USE (MULTI): Primary | ICD-10-CM

## 2025-06-16 DIAGNOSIS — E11.65 TYPE 2 DIABETES MELLITUS WITH HYPERGLYCEMIA, UNSPECIFIED WHETHER LONG TERM INSULIN USE (MULTI): ICD-10-CM

## 2025-06-16 RX ORDER — TIRZEPATIDE 10 MG/.5ML
10 INJECTION, SOLUTION SUBCUTANEOUS
Qty: 6 ML | Refills: 3 | Status: SHIPPED | OUTPATIENT
Start: 2025-06-16

## 2025-06-16 NOTE — PROGRESS NOTES
[facility] [place] [facility]      Sandra Ibarrabrittney hager 59 y.o. patient was referred to the Clinical Pharmacy Team for diabetes management.  Presents today via telephone for assessment and management,  &  PAP renewal.      Referring Provider: Rito Hernandez MD     Pt is testing sugars 1 times per day. Pt is having low sugars 0 times/week. Pt's typical blood sugars are running 120s on waking, low 100's in the evening. Pt is following a carb controlled diet and knows reasonable carb allowances. Pt is able to afford their medications. Pt is not all that active, physical at work.          Taking metformin 1gram bid, anuj 30mg, mounjaro 10 mg (trulicity in past) through  pap  -no longer on glimepiride     Taking atorvastatin 20mg for lipids and tolerating.      Taking lisinopril 10mg for htn.         Current Medications[1]    Allergies as of 06/16/2025    (No Known Allergies)       Labs:   Lab Results   Component Value Date    CALCIUM 9.6 03/20/2025    AST 16 03/20/2025    ALKPHOS 68 03/20/2025    BILITOT 0.4 03/20/2025    PROT 7.3 03/20/2025    ALBUMIN 4.6 03/20/2025    GLOB 2.5 09/20/2023    AGR 1.8 09/20/2023     03/20/2025    K 4.2 03/20/2025     03/20/2025    CO2 22 03/20/2025    ANIONGAP 14 03/20/2025    BUN 9 03/20/2025    CREATININE 0.57 03/20/2025    UREACREAUR 15.0 09/20/2023    GLUCOSE 94 03/20/2025    ALT 13 03/20/2025    EGFR 106 03/20/2025     Lab Results   Component Value Date    CHOL 128 10/10/2024    TRIG 148 10/10/2024    HDL 47.9 10/10/2024    LDLCALC 51 10/10/2024     Lab Results   Component Value Date    MICROALBCREA 5.0 10/10/2024     Lab Results   Component Value Date    HGBA1C 6.0 03/20/2025        Patient Assistance Screening (VAF)  Patient verbally reports monthly or yearly income which is less than 400% federal poverty level.  Renewal application for program has been submitted for the following medications: Mounjaro  Patient has been informed that program team will be reaching  out to them to discuss necessary documentation, instructed to answer phone/return voicemail.   Patient aware this process may take up to 6 weeks.   If approved medication must be filled through [facility] pharmacy and may be picked up or mailed to patient.    PATIENT EDUCATION/DISCUSSION:  - Counseled patient on MOA, expectations, side effects, duration of therapy, contraindications, administration, and monitoring parameters  - Answered all patient questions and concerns    Assessment/Plan   1. Type 2 diabetes mellitus with hyperglycemia, unspecified whether long term insulin use (Multi)    -overall doing great, no change in mounjaro for now as still losing wt and at A1c target  -room to increase dosage if needed    -lower metformin from 1gram bid to metformin 500mgxr bid  (Consider stopping at next visit and lower anuj by 50% pending repeat A1c results)      1. Continue all meds under the continuation of care with the referring provider and clinical pharmacy team.  2. Prescription(s) sent to [facility] pharmacy for assistance on authorization and copay. Medication will be mailed to patient.    Follow up as scheduled with Dr. Hernandez & team in endocrinology office.  Follow up annually with clinical pharmacist for re-enrollment in [facility]    Thank you,   Livia Oakley, PharmD, BC-ADM, [facility]     Verbal consent to manage patient's drug therapy was obtained from the patient and/or an individual authorized to act on behalf of a patient. They were informed they may decline to participate or withdraw from participation in pharmacy services at any time.       [1]   Current Outpatient Medications:     atorvastatin (Lipitor) 20 mg tablet, Take 1 tablet (20 mg) by mouth once daily., Disp: 90 tablet, Rfl: 3    blood sugar diagnostic strip, 1 x daily for 90 days, Disp: , Rfl:     cholecalciferol (Vitamin D-3) 25 MCG (1000 UT) tablet, Take 1 tablet (1,000 Units) by mouth once daily., Disp: , Rfl:     lancets misc, as directed As  directed for 90 days, Disp: , Rfl:     lisinopril 10 mg tablet, TAKE ONE TABLET BY MOUTH DAILY, Disp: 90 tablet, Rfl: 3    metFORMIN XR (Glucophage-XR) 500 mg 24 hr tablet, Take 1 tablet (500 mg) by mouth 2 times daily (morning and late afternoon)., Disp: , Rfl:     multivitamin with minerals iron-free (Centrum Silver), Take 1 tablet by mouth once daily., Disp: , Rfl:     omeprazole (PriLOSEC) 20 mg DR capsule, Take 1 capsule (20 mg) by mouth once daily. Do not crush or chew., Disp: 90 capsule, Rfl: 3    oxybutynin XL (Ditropan-XL) 10 mg 24 hr tablet, TAKE ONE TABLET BY MOUTH EVERY DAY, Disp: 90 tablet, Rfl: 0    pioglitazone (Actos) 30 mg tablet, TAKE ONE TABLET BY MOUTH ONCE DAILY, Disp: 90 tablet, Rfl: 0    tirzepatide (Mounjaro) 10 mg/0.5 mL pen injector, Inject 10 mg under the skin 1 (one) time per week., Disp: 6 mL, Rfl: 3

## 2025-06-19 PROCEDURE — RXMED WILLOW AMBULATORY MEDICATION CHARGE

## 2025-06-21 ENCOUNTER — PHARMACY VISIT (OUTPATIENT)
Dept: PHARMACY | Facility: CLINIC | Age: 60
End: 2025-06-21
Payer: COMMERCIAL

## 2025-06-30 ENCOUNTER — TREATMENT (OUTPATIENT)
Dept: PHYSICAL THERAPY | Facility: CLINIC | Age: 60
End: 2025-06-30
Payer: MEDICARE

## 2025-06-30 DIAGNOSIS — M54.16 LUMBAR BACK PAIN WITH RADICULOPATHY AFFECTING RIGHT LOWER EXTREMITY: ICD-10-CM

## 2025-06-30 DIAGNOSIS — M54.50 LOW BACK PAIN, UNSPECIFIED BACK PAIN LATERALITY, UNSPECIFIED CHRONICITY, UNSPECIFIED WHETHER SCIATICA PRESENT: ICD-10-CM

## 2025-06-30 PROCEDURE — 97110 THERAPEUTIC EXERCISES: CPT | Mod: GP | Performed by: PHYSICAL THERAPIST

## 2025-06-30 NOTE — PROGRESS NOTES
Physical Therapy Treatment    Patient Name: Sandra Bradshaw  MRN: [id number]  Encounter date:  6/30/2025  Time Calculation  Start Time: 1258  Stop Time: 1337  Time Calculation (min): 39 min     PT Therapeutic Procedures Time Entry  Therapeutic Exercise Time Entry: 39    Visit Number:  2 (including evaluation)  Planned total visits: (including evaluation)  Visit Authorized:  11  EVAL ONLY-AUTH RCVD 10 VISITS APPROVED 6/12/25-8/15/25. CPT CODES 75761, 98552, 03684, 56238, 34396, 63908, 61949, 12806    2025: EVAL ONLY - CS [employer] 7500 - AUTH REQ / $9200 OOP not met / $50 COPAY / 20V/yr - 0 used / Per CS W/S, Availity [id number] / ds 6/3/25 //    Current Problem  Problem List Items Addressed This Visit           ICD-10-CM    Low back pain M54.50     Other Visit Diagnoses         Codes      Lumbar back pain with radiculopathy affecting right lower extremity     M54.16            Surgery:   History of remote neck surgery    Precautions   History of neck surgery and diabetes hypertension     Pain   5  Location lb, OUTER CALF, AND R SHS TIGHTNESS  Description sore  No t  N int R lat foot     Subjective  General        LBP is int, but about the same , temp relief with lat ex's and with most movements or position change.  Takes a walk but too painful after 3 houses down    OBJECTIVE:  If left blank, assume NT:    Tingling in R foot after standing for a minute, sat down and did not get better  next time standing tingling in R foot less than in sitting, but then worse after standing a minute again     Myotomes/MMT's R L   Hip Flexion 5  4+    Knee Extension  5 4+ knee pain    Ankle Dorsiflexion 4  4+    EHL 4  5    Ankle Plantarflexion 5  5   PPT = G-                DTR          NE=No effect, C=centralize, A=abolish, p=peripheralize, P=produce, B=better, W=worse, D=decrease, I=increase, NW=no worse, NB=no better     Lumbar spine/trunk ROM S/S   FIS Min/mod decr Pulling R back   EIS Min decrease Mild increase on  "the way up   SG L     SG R Min decrease Moderate decrease             S/S During S/S After   Rep FIS       Rep EIS       Rep EIL variable variable   Rep MANPREET       Rep SG L       Rep SG R Decrease variable                Treatment:     TE's to incr strength, ROM, decr pain:    Supine:  TrA 5\" x 10  PPT 5\" x 10  With TrA:  Iso hip add 5\" 2 x 10  BKFO'S 5\" X 5  Clam shell G Tb 2 x 10  B sh flexion with ball 2 x 10  Heel slide 2 x 10 ea  March 2 x 10    Seated:    HS stretching  LLLD 1' ea    Stand:  Calf stretch 2 x 20\"      OP EDUCATION:   Stop side glides and press ups, begin:  TrA  Ppt  Iso hip add  Hip abd with band    Assessment:     Pt's response to treatment:  no longer has direction preference  Areas of improvements:  Hep knowledge  Limitations/deficits:  flexibility, pain, weakness    pt is s/p onset of right low back pain and radicular symptoms down right lower extremity and needs PT to incr ROM, strength, flexibility, improve posture and decr pain to restore function.     Pain end of session:  1-2      Continued PT required to reach all goals and restore function    Plan     Next visit continue core stab.     Skilled PT consisting of:  Aquatics, therapeutic exercise, therapeutic activity, NMR, manual, thermal, electric stimulation, US, light therapy, gait training, transfer training, dry needle.  Mechanical traction PRN and only if OK by PT, canalith repositioning  Rehab Potential: good  Frequency:  2x/wk  Duration: 20 weeks     Goals:     STG:   Pt to be I in initial HEP.  Pt to be I in posture correction.     LTG\"s:  Incr MMT of lower extremities and core by 1 grade for improved stability work.  Improve score on outcome form by 10 points to show incr in function.  Incr ROM of lumbar spine flexion and extension to WFL for less strain with bending  Decr max pain level to 2 for all activities including sleeping on her side.  Incr flexibility of hamstrings to within functional limits to allow improved trunk " flexion with less strain on back       Some of This note was created using voice recognition software and was not corrected for typographical or grammatical errors.

## 2025-07-07 ENCOUNTER — TREATMENT (OUTPATIENT)
Dept: PHYSICAL THERAPY | Facility: CLINIC | Age: 60
End: 2025-07-07
Payer: MEDICARE

## 2025-07-07 DIAGNOSIS — M54.50 LOW BACK PAIN, UNSPECIFIED BACK PAIN LATERALITY, UNSPECIFIED CHRONICITY, UNSPECIFIED WHETHER SCIATICA PRESENT: Primary | ICD-10-CM

## 2025-07-07 DIAGNOSIS — M54.16 LUMBAR BACK PAIN WITH RADICULOPATHY AFFECTING RIGHT LOWER EXTREMITY: ICD-10-CM

## 2025-07-07 PROCEDURE — 97110 THERAPEUTIC EXERCISES: CPT | Mod: GP,CQ

## 2025-07-07 PROCEDURE — 97140 MANUAL THERAPY 1/> REGIONS: CPT | Mod: GP,CQ

## 2025-07-07 PROCEDURE — 97014 ELECTRIC STIMULATION THERAPY: CPT | Mod: GP,CQ

## 2025-07-07 NOTE — PROGRESS NOTES
Physical Therapy Treatment    Patient Name: Sandra Bradshaw  MRN: [id number]  Encounter date:  7/7/2025  Time Calculation  Start Time: 0845  Stop Time: 0925  Time Calculation (min): 40 min  PT Modalities Time Entry  E-Stim (Unattended) Time Entry: 10  PT Therapeutic Procedures Time Entry  Therapeutic Exercise Time Entry: 20  Manual Therapy Time Entry: 10    Visit Number:  3 (including evaluation)  Planned total visits: (including evaluation)  Visit Authorized:  11  EVAL ONLY-AUTH RCVD 10 VISITS APPROVED 6/12/25-8/15/25. CPT CODES 55074, 63810, 27502, 70105, 92039, 88228, 39294, 37330    2025: EVAL ONLY - CS [employer] 7500 - AUTH REQ / $9200 OOP not met / $50 COPAY / 20V/yr - 0 used / Per CS W/S, Availity [phone] / ds 6/3/25 //    Current Problem  Problem List Items Addressed This Visit           ICD-10-CM       Musculoskeletal and Injuries    Low back pain - Primary M54.50     Other Visit Diagnoses         Codes      Lumbar back pain with radiculopathy affecting right lower extremity     M54.16              Surgery:   History of remote neck surgery    Precautions   History of neck surgery and diabetes hypertension     Pain   5  Location lb, OUTER CALF, AND R SHS TIGHTNESS  Description sore  No t  N int R lat foot     Subjective: LBP 5-6/10 feels like I am lying on a walnut in bed  General        LBP is int, but about the same , temp relief with lat ex's and with most movements or position change.  Takes a walk but too painful after 3 houses down    OBJECTIVE:  If left blank, assume NT:    Tingling in R foot after standing for a minute, sat down and did not get better  next time standing tingling in R foot less than in sitting, but then worse after standing a minute again     Myotomes/MMT's R L   Hip Flexion 5  4+    Knee Extension  5 4+ knee pain    Ankle Dorsiflexion 4  4+    EHL 4  5    Ankle Plantarflexion 5  5   PPT = G-                DTR          NE=No effect, C=centralize, A=abolish,  "p=peripheralize, P=produce, B=better, W=worse, D=decrease, I=increase, NW=no worse, NB=no better     Lumbar spine/trunk ROM S/S   FIS Min/mod decr Pulling R back   EIS Min decrease Mild increase on the way up   SG L     SG R Min decrease Moderate decrease             S/S During S/S After   Rep FIS       Rep EIS       Rep EIL variable variable   Rep MANPREET       Rep SG L       Rep SG R Decrease variable                Treatment:     TE's to incr strength, ROM, decr pain:    Supine:  TrA 5\" x 10  PPT 5\" x 10  With TrA:  Iso hip add 5\" 2 x 10  BKFO'S 5\" X 5  Clam shell G Tb 2 x 10  B sh flexion with ball 2 x 10  Heel slide 2 x 10 ea  March 2 x 10    Seated:    HS stretching  LLLD 1' ea    Stand:--- DNP reported this makes the LBP increase  Calf stretch 2 x 20\"    Standing:  with UE support for strengthening:  Hip abduction 1 x10 with 0 #  Hamstring curl 1 x10 with 0 #  Small squat 1 x10   Toe raise1 x10     Seated LE exercise for strengthening   LAQ 1 x10 with 2 #  Marching 1 x10 2 #  Abduction with GTB theraband  1 x10  ball squeeze  1 x10    Unattended electrical stimulation:  Interferential current  Parameters:  80 to 150 Hz  Patient position:  Supine with wedge  Electrodes placed:  B LS area  Comment:  skin check good    MANUAL THERAPY: STM B LS area R> L sitting leaning onto pillows                OP EDUCATION:   Stop side glides and press ups, begin:  TrA  Ppt  Iso hip add  Hip abd with band    Assessment: Feels slight relief following session completion 5/10, cont with HEP , feels IFC seemed to help     Pt's response to treatment:  no longer has direction preference  Areas of improvements:  Hep knowledge  Limitations/deficits:  flexibility, pain, weakness    pt is s/p onset of right low back pain and radicular symptoms down right lower extremity and needs PT to incr ROM, strength, flexibility, improve posture and decr pain to restore function.     Pain end of session:  1-2      Continued PT required to reach all " "goals and restore function    Plan     Next visit continue core stab.     Skilled PT consisting of:  Aquatics, therapeutic exercise, therapeutic activity, NMR, manual, thermal, electric stimulation, US, light therapy, gait training, transfer training, dry needle.  Mechanical traction PRN and only if OK by PT, canalith repositioning  Rehab Potential: good  Frequency:  2x/wk  Duration: 20 weeks     Goals:     STG:   Pt to be I in initial HEP.  Pt to be I in posture correction.     LTG\"s:  Incr MMT of lower extremities and core by 1 grade for improved stability work.  Improve score on outcome form by 10 points to show incr in function.  Incr ROM of lumbar spine flexion and extension to WFL for less strain with bending  Decr max pain level to 2 for all activities including sleeping on her side.  Incr flexibility of hamstrings to within functional limits to allow improved trunk flexion with less strain on back       Some of This note was created using voice recognition software and was not corrected for typographical or grammatical errors.        "

## 2025-07-09 ENCOUNTER — TREATMENT (OUTPATIENT)
Dept: PHYSICAL THERAPY | Facility: CLINIC | Age: 60
End: 2025-07-09
Payer: MEDICARE

## 2025-07-09 DIAGNOSIS — M54.16 LUMBAR BACK PAIN WITH RADICULOPATHY AFFECTING RIGHT LOWER EXTREMITY: ICD-10-CM

## 2025-07-09 DIAGNOSIS — M54.50 LOW BACK PAIN, UNSPECIFIED BACK PAIN LATERALITY, UNSPECIFIED CHRONICITY, UNSPECIFIED WHETHER SCIATICA PRESENT: Primary | ICD-10-CM

## 2025-07-09 PROCEDURE — 97140 MANUAL THERAPY 1/> REGIONS: CPT | Mod: GP,CQ

## 2025-07-09 PROCEDURE — 97110 THERAPEUTIC EXERCISES: CPT | Mod: GP,CQ

## 2025-07-09 PROCEDURE — 97014 ELECTRIC STIMULATION THERAPY: CPT | Mod: GP,CQ

## 2025-07-09 NOTE — PROGRESS NOTES
Physical Therapy Treatment    Patient Name: Sandra Bradshaw  MRN: [id number]  Encounter date:  7/9/2025  Time Calculation  Start Time: 0755  Stop Time: 0835  Time Calculation (min): 40 min  PT Modalities Time Entry  E-Stim (Unattended) Time Entry: 10  PT Therapeutic Procedures Time Entry  Therapeutic Exercise Time Entry: 20  Manual Therapy Time Entry: 10    Visit Number:  4 (including evaluation)  Planned total visits: (including evaluation)  Visit Authorized:  11  EVAL ONLY-AUTH RCVD 10 VISITS APPROVED 6/12/25-8/15/25. CPT CODES 05634, 21311, 87343, 57330, 18933, 70485, 50681, 67304    2025: EVAL ONLY - CS [employer] 7500 - AUTH REQ / $9200 OOP not met / $50 COPAY / 20V/yr - 0 used / Per CS W/S, Availity [phone] / ds 6/3/25 //    Current Problem    Problem List Items Addressed This Visit           ICD-10-CM    Low back pain M54.50     Other Visit Diagnoses         Codes      Lumbar back pain with radiculopathy affecting right lower extremity     M54.16          Surgery:   History of remote neck surgery    Precautions   History of neck surgery and diabetes hypertension     Pain   5  Location lb, OUTER CALF, AND R SHS TIGHTNESS  Description sore  No t  N int R lat foot     Subjective: Felt amazing after last visit , relief lasting into evening, did more laundry/housework discomfort  came back R LS area  General        LBP is int, but about the same , temp relief with lat ex's and with most movements or position change.  Takes a walk but too painful after 3 houses down    OBJECTIVE:   (PRIOR)  If left blank, assume NT:    Tingling in R foot after standing for a minute, sat down and did not get better  next time standing tingling in R foot less than in sitting, but then worse after standing a minute again     Myotomes/MMT's R L   Hip Flexion 5  4+    Knee Extension  5 4+ knee pain    Ankle Dorsiflexion 4  4+    EHL 4  5    Ankle Plantarflexion 5  5   PPT = G-                DTR          NE=No effect,  "C=centralize, A=abolish, p=peripheralize, P=produce, B=better, W=worse, D=decrease, I=increase, NW=no worse, NB=no better     Lumbar spine/trunk ROM S/S   FIS Min/mod decr Pulling R back   EIS Min decrease Mild increase on the way up   SG L     SG R Min decrease Moderate decrease             S/S During S/S After   Rep FIS       Rep EIS       Rep EIL variable variable   Rep MANPREET       Rep SG L       Rep SG R Decrease variable                Treatment:     TE's to incr strength, ROM, decr pain:    Supine:  TrA 5\" x 10  PPT 5\" x 10  With TrA:  Iso hip add 5\" 2 x 10  BKFO'S 5\" X 5  Clam shell G Tb 2 x 10  B sh flexion with ball 2 x 10  Heel slide 2 x 10 ea  March 2 x 10  SAQ # 2 wt applied    Seated:    HS stretching  LLLD 1' ea    Standing:  with UE support for strengthening:  Hip abduction 1 x10 with 2 #  Hamstring curl 1 x10 with 2 #  Small squat 1 x10   Toe raise1 x10     Seated LE exercise for strengthening   LAQ 1 x10 with 2 #  Marching 1 x10 2 #    Unattended electrical stimulation:  Interferential current  Parameters:  80 to 150 Hz  Patient position:  Supine with wedge  Electrodes placed:  B LS area with HP  Comment:  skin check good    MANUAL THERAPY: STM B LS area R> L sitting leaning onto pillows                OP EDUCATION:   Stop side glides and press ups, begin:  TrA  Ppt  Iso hip add  Hip abd with band    Assessment: Feels slight relief following session completion 5/10, cont with HEP , feels IFC seemed to help     Pt's response to treatment:  no longer has direction preference  Areas of improvements:  Hep knowledge  Limitations/deficits:  flexibility, pain, weakness    pt is s/p onset of right low back pain and radicular symptoms down right lower extremity and needs PT to incr ROM, strength, flexibility, improve posture and decr pain to restore function.     Pain end of session:  1-2      Continued PT required to reach all goals and restore function    Plan     Next visit continue core stab.     Skilled " "PT consisting of:  Aquatics, therapeutic exercise, therapeutic activity, NMR, manual, thermal, electric stimulation, US, light therapy, gait training, transfer training, dry needle.  Mechanical traction PRN and only if OK by PT, canalith repositioning  Rehab Potential: good  Frequency:  2x/wk  Duration: 20 weeks     Goals:     STG:   Pt to be I in initial HEP.  Pt to be I in posture correction.     LTG\"s:  Incr MMT of lower extremities and core by 1 grade for improved stability work.  Improve score on outcome form by 10 points to show incr in function.  Incr ROM of lumbar spine flexion and extension to WFL for less strain with bending  Decr max pain level to 2 for all activities including sleeping on her side.  Incr flexibility of hamstrings to within functional limits to allow improved trunk flexion with less strain on back       Some of This note was created using voice recognition software and was not corrected for typographical or grammatical errors.        "

## 2025-07-12 PROCEDURE — RXMED WILLOW AMBULATORY MEDICATION CHARGE

## 2025-07-14 ENCOUNTER — TREATMENT (OUTPATIENT)
Dept: PHYSICAL THERAPY | Facility: CLINIC | Age: 60
End: 2025-07-14
Payer: MEDICARE

## 2025-07-14 DIAGNOSIS — M54.50 LOW BACK PAIN, UNSPECIFIED BACK PAIN LATERALITY, UNSPECIFIED CHRONICITY, UNSPECIFIED WHETHER SCIATICA PRESENT: ICD-10-CM

## 2025-07-14 DIAGNOSIS — M54.16 LUMBAR BACK PAIN WITH RADICULOPATHY AFFECTING RIGHT LOWER EXTREMITY: ICD-10-CM

## 2025-07-14 PROCEDURE — 97110 THERAPEUTIC EXERCISES: CPT | Mod: GP | Performed by: PHYSICAL THERAPIST

## 2025-07-14 PROCEDURE — 97014 ELECTRIC STIMULATION THERAPY: CPT | Mod: GP | Performed by: PHYSICAL THERAPIST

## 2025-07-14 PROCEDURE — 97140 MANUAL THERAPY 1/> REGIONS: CPT | Mod: GP | Performed by: PHYSICAL THERAPIST

## 2025-07-14 NOTE — PROGRESS NOTES
Physical Therapy Treatment    Patient Name: Sandra Bradshaw  MRN: [id number]  Encounter date:  7/14/2025  Time Calculation  Start Time: 1300  Stop Time: 1338  Time Calculation (min): 38 min  PT Modalities Time Entry  E-Stim (Unattended) Time Entry: 10  PT Therapeutic Procedures Time Entry  Therapeutic Exercise Time Entry: 20  Manual Therapy Time Entry: 8    Visit Number:  5 (including evaluation)  Planned total visits: (including evaluation)  Visit Authorized:  11  EVAL ONLY-AUTH RCVD 10 VISITS APPROVED 6/12/25-8/15/25. CPT CODES 64482, 64034, 06183, 53482, 57464, 73998, 09482, 49088    2025: EVAL ONLY - CS [employer] - AUTH REQ / $9200 OOP not met / $50 COPAY / 20V/yr - 0 used / Per CS W/S, Availity [id number] / ds 6/3/25 //    Current Problem    Problem List Items Addressed This Visit           ICD-10-CM    Low back pain M54.50     Other Visit Diagnoses         Codes      Lumbar back pain with radiculopathy affecting right lower extremity     M54.16          Surgery:   History of remote neck surgery    Precautions   History of neck surgery and diabetes hypertension     Pain   2-3  Location lb, OUTER CALF, AND R SHS TIGHTNESS  Description sore  No t  N int R lat foot     Subjective: Felt a few hours relief after last session would like to continue stim and massage.  No questions about HEP at this time.  But notes that she stopped gastroc stretch due to this increasing pain in her back and whole leg.        OBJECTIVE:     Increased tension bilateral hamstring muscles    (PRIOR)  If left blank, assume NT:    Tingling in R foot after standing for a minute, sat down and did not get better  next time standing tingling in R foot less than in sitting, but then worse after standing a minute again     Myotomes/MMT's R L   Hip Flexion 5  4+    Knee Extension  5 4+ knee pain    Ankle Dorsiflexion 4  4+    EHL 4  5    Ankle Plantarflexion 5  5   PPT = G-                DTR          NE=No effect, C=centralize,  "A=abolish, p=peripheralize, P=produce, B=better, W=worse, D=decrease, I=increase, NW=no worse, NB=no better     Lumbar spine/trunk ROM S/S   FIS Min/mod decr Pulling R back   EIS Min decrease Mild increase on the way up   SG L     SG R Min decrease Moderate decrease             S/S During S/S After   Rep FIS       Rep EIS       Rep EIL variable variable   Rep MANPREET       Rep SG L       Rep SG R Decrease variable                Treatment:     TE's to incr strength, ROM, decr pain:    Supine:  TrA 5\" x 10  PPT 5\" x 10  With TrA:  Iso hip add 5\" 2 x 10  BKFO'S 5\" X 5  Clam shell G Tb 2 x 10 DNP   B sh flexion with ball 2 x 10  Heel slide 2 x 10 ea DNP  March 2 x 10  SAQ # 2 wt applied dnp    Seated:    HS stretching  LLLD 2' ea    Standing:  with UE support for strengthening:  Hip abduction 2 x10 with 2 #  Hamstring curl 1 x10 with 2 #  Small squat 1 x10   Toe raise1 x10  Standing wedge to stretch gastrocs 2 x 30 seconds    Seated LE exercise for strengthening   LAQ 1 x10 with 2 #  Marching 1 x10 2 # DNP    Unattended electrical stimulation:  Interferential current  Parameters:  80 to 150 Hz level 24  Patient position:  Supine with wedge  Electrodes placed:  B LS area with HP  Comment:  skin check good    MANUAL THERAPY: STM B LS area R> L sitting leaning onto pillows        OP EDUCATION:  Access Code: [email or web address]  URL: [email or web address]  Date: 07/14/2025  Prepared by: Jayson Fernández    Exercises  - Seated Ankle Dorsiflexion with Ankle Weight  - 1 x daily - 7 x weekly - 3 sets - 10 reps  - Heel Raises with Counter Support  - 1 x daily - 7 x weekly - 3 sets - 10 reps  Okay to stand with forefoot on block for gastroc stretch rather than runners stretch to decrease symptoms.    Prior:   Stop side glides and press ups, begin:  TrA  Ppt  Iso hip add  Hip abd with band    Assessment: good pace with all te's after cues     Pt's response to treatment:  no longer has direction preference  Areas of improvements:  Hep " "knowledge, pain  Limitations/deficits:  flexibility, pain, weakness    pt is s/p onset of right low back pain and radicular symptoms down right lower extremity and needs PT to incr ROM, strength, flexibility, improve posture and decr pain to restore function.     Pain end of session:  less pain      Continued PT required to reach all goals and restore function    Plan     Next visit continue core stab.     Skilled PT consisting of:  Aquatics, therapeutic exercise, therapeutic activity, NMR, manual, thermal, electric stimulation, US, light therapy, gait training, transfer training, dry needle.  Mechanical traction PRN and only if OK by PT, canalith repositioning  Rehab Potential: good  Frequency:  2x/wk  Duration: 20 weeks     Goals:     STG:   Pt to be I in initial HEP.  Pt to be I in posture correction.     LTG\"s:  Incr MMT of lower extremities and core by 1 grade for improved stability work.  Improve score on outcome form by 10 points to show incr in function.  Incr ROM of lumbar spine flexion and extension to WFL for less strain with bending  Decr max pain level to 2 for all activities including sleeping on her side.  Incr flexibility of hamstrings to within functional limits to allow improved trunk flexion with less strain on back       Some of This note was created using voice recognition software and was not corrected for typographical or grammatical errors.        "

## 2025-07-16 ENCOUNTER — PHARMACY VISIT (OUTPATIENT)
Dept: PHARMACY | Facility: CLINIC | Age: 60
End: 2025-07-16
Payer: COMMERCIAL

## 2025-07-16 ENCOUNTER — TREATMENT (OUTPATIENT)
Dept: PHYSICAL THERAPY | Facility: CLINIC | Age: 60
End: 2025-07-16
Payer: MEDICARE

## 2025-07-16 DIAGNOSIS — M54.50 LOW BACK PAIN, UNSPECIFIED BACK PAIN LATERALITY, UNSPECIFIED CHRONICITY, UNSPECIFIED WHETHER SCIATICA PRESENT: ICD-10-CM

## 2025-07-16 DIAGNOSIS — M54.16 LUMBAR BACK PAIN WITH RADICULOPATHY AFFECTING RIGHT LOWER EXTREMITY: ICD-10-CM

## 2025-07-16 PROCEDURE — 97110 THERAPEUTIC EXERCISES: CPT | Mod: GP,CQ

## 2025-07-16 PROCEDURE — 97140 MANUAL THERAPY 1/> REGIONS: CPT | Mod: GP,CQ

## 2025-07-16 PROCEDURE — 97014 ELECTRIC STIMULATION THERAPY: CPT | Mod: GP,CQ

## 2025-07-16 NOTE — PROGRESS NOTES
Physical Therapy Treatment    Patient Name: Sandra Bradshaw  MRN: [id number]  Encounter date:  7/16/2025  Time Calculation  Start Time: 0800  Stop Time: 0840  Time Calculation (min): 40 min  PT Modalities Time Entry  E-Stim (Unattended) Time Entry: 10  PT Therapeutic Procedures Time Entry  Therapeutic Exercise Time Entry: 20  Manual Therapy Time Entry: 10    Visit Number:  6 (including evaluation)  Planned total visits: (including evaluation)  Visit Authorized:  11  EVAL ONLY-AUTH RCVD 10 VISITS APPROVED 6/12/25-8/15/25. CPT CODES 33696, 94443, 43930, 95214, 70244, 15383, 25826, 48073    2025: EVAL ONLY - CS [employer] - AUTH REQ / $9200 OOP not met / $50 COPAY / 20V/yr - 0 used / Per CS W/S, Availity [id number] / ds 6/3/25 //    Current Problem    Problem List Items Addressed This Visit           ICD-10-CM    Low back pain M54.50     Other Visit Diagnoses         Codes      Lumbar back pain with radiculopathy affecting right lower extremity     M54.16          Surgery:   History of remote neck surgery    Precautions   History of neck surgery and diabetes hypertension     Pain   2-3  Location lb, OUTER CALF, AND R SHS TIGHTNESS  Description sore  No t  N int R lat foot     Subjective:  Discomfort has decreased radiating down R LE however does still persist now only 2-3/10      Felt a few hours relief after last session would like to continue stim and massage.  No questions about HEP at this time.  But notes that she stopped gastroc stretch due to this increasing pain in her back and whole leg.    OBJECTIVE:     Increased tension bilateral hamstring muscles    (PRIOR)  If left blank, assume NT:    Tingling in R foot after standing for a minute, sat down and did not get better  next time standing tingling in R foot less than in sitting, but then worse after standing a minute again     Myotomes/MMT's R L   Hip Flexion 5  4+    Knee Extension  5 4+ knee pain    Ankle Dorsiflexion 4  4+    EHL 4  5   "  Ankle Plantarflexion 5  5   PPT = G-                DTR          NE=No effect, C=centralize, A=abolish, p=peripheralize, P=produce, B=better, W=worse, D=decrease, I=increase, NW=no worse, NB=no better     Lumbar spine/trunk ROM S/S   FIS Min/mod decr Pulling R back   EIS Min decrease Mild increase on the way up   SG L     SG R Min decrease Moderate decrease             S/S During S/S After   Rep FIS       Rep EIS       Rep EIL variable variable   Rep MANPREET       Rep SG L       Rep SG R Decrease variable                Treatment:     TE's to incr strength, ROM, decr pain:    Supine:  PPT 5\" x 10  Iso hip add 5\" 2 x 10  BKFO'S 5\" X 5  B sh flexion and LTR  with Red T - ball 2 x 10  March 2 x 10 # 2 wt applied  SAQ # 2 wt applied 10 x 1    Standing:  with UE support for strengthening:  Hip abduction 2 x10 with 2 #  Hamstring curl 1 x10 with 2 #  Small squat 1 x10   Toe raise1 x10    Seated LE exercise for strengthening   LAQ 1 x10 with 2 #  Hamstring stretch    Unattended electrical stimulation:  Interferential current  Parameters:  80 to 150 Hz level 24  Patient position:  Supine with wedge  Electrodes placed:  B LS area with HP  Comment:  skin check good    MANUAL THERAPY: STM B LS area R> L sitting leaning onto pillows        OP EDUCATION:  Access Code: [email or web address]  URL: [email or web address]  Date: 07/14/2025  Prepared by: Jayson Fernández    Exercises  - Seated Ankle Dorsiflexion with Ankle Weight  - 1 x daily - 7 x weekly - 3 sets - 10 reps  - Heel Raises with Counter Support  - 1 x daily - 7 x weekly - 3 sets - 10 reps  Okay to stand with forefoot on block for gastroc stretch rather than runners stretch to decrease symptoms.    Prior:   Stop side glides and press ups, begin:  TrA  Ppt  Iso hip add  Hip abd with band    Assessment: Performed all ex well without increased discomfort , cont with HEP         Pt's response to treatment:  no longer has direction preference  Areas of improvements:  Hep knowledge, " "pain  Limitations/deficits:  flexibility, pain, weakness    pt is s/p onset of right low back pain and radicular symptoms down right lower extremity and needs PT to incr ROM, strength, flexibility, improve posture and decr pain to restore function.     Pain end of session:  less pain      Continued PT required to reach all goals and restore function    Plan     Next visit continue core stab.     Skilled PT consisting of:  Aquatics, therapeutic exercise, therapeutic activity, NMR, manual, thermal, electric stimulation, US, light therapy, gait training, transfer training, dry needle.  Mechanical traction PRN and only if OK by PT, canalith repositioning  Rehab Potential: good  Frequency:  2x/wk  Duration: 20 weeks     Goals:     STG:   Pt to be I in initial HEP.  Pt to be I in posture correction.     LTG\"s:  Incr MMT of lower extremities and core by 1 grade for improved stability work.  Improve score on outcome form by 10 points to show incr in function.  Incr ROM of lumbar spine flexion and extension to WFL for less strain with bending  Decr max pain level to 2 for all activities including sleeping on her side.  Incr flexibility of hamstrings to within functional limits to allow improved trunk flexion with less strain on back       Some of This note was created using voice recognition software and was not corrected for typographical or grammatical errors.        "

## 2025-07-21 ENCOUNTER — TREATMENT (OUTPATIENT)
Dept: PHYSICAL THERAPY | Facility: CLINIC | Age: 60
End: 2025-07-21
Payer: MEDICARE

## 2025-07-21 DIAGNOSIS — M54.16 LUMBAR BACK PAIN WITH RADICULOPATHY AFFECTING RIGHT LOWER EXTREMITY: ICD-10-CM

## 2025-07-21 DIAGNOSIS — M54.50 LOW BACK PAIN, UNSPECIFIED BACK PAIN LATERALITY, UNSPECIFIED CHRONICITY, UNSPECIFIED WHETHER SCIATICA PRESENT: ICD-10-CM

## 2025-07-21 PROCEDURE — 97110 THERAPEUTIC EXERCISES: CPT | Mod: GP | Performed by: PHYSICAL THERAPIST

## 2025-07-21 PROCEDURE — 97014 ELECTRIC STIMULATION THERAPY: CPT | Mod: GP | Performed by: PHYSICAL THERAPIST

## 2025-07-21 PROCEDURE — 97140 MANUAL THERAPY 1/> REGIONS: CPT | Mod: GP | Performed by: PHYSICAL THERAPIST

## 2025-07-21 NOTE — PROGRESS NOTES
Physical Therapy Treatment    Patient Name: Sandra Bradshaw  MRN: [id number]  Encounter date:  7/21/2025  Time Calculation  Start Time: 1303  Stop Time: 1341  Time Calculation (min): 38 min  PT Modalities Time Entry  E-Stim (Unattended) Time Entry: 10  PT Therapeutic Procedures Time Entry  Therapeutic Exercise Time Entry: 20  Manual Therapy Time Entry: 8    Visit Number:  7 (including evaluation)  Planned total visits: (including evaluation)  Visit Authorized:  11  EVAL ONLY-AUTH RCVD 10 VISITS APPROVED 6/12/25-8/15/25. CPT CODES 28681, 24926, 50682, 49446, 18840, 82638, 86905, 74265    2025: EVAL ONLY - CS [employer] - AUTH REQ / $9200 OOP not met / $50 COPAY / 20V/yr - 0 used / Per CS W/S, Availity [id number] / ds 6/3/25 //    Current Problem    Problem List Items Addressed This Visit           ICD-10-CM    Low back pain M54.50     Other Visit Diagnoses         Codes      Lumbar back pain with radiculopathy affecting right lower extremity     M54.16          Surgery:   History of remote neck surgery    Precautions   History of neck surgery and diabetes hypertension     Pain   0  Location lb, 2 OUTER CALF, AND no TIGHTNESS  Description sore  No t  N/tingle int R lat foot     Subjective:  able to go to [employer] without it killing me.  Only wearing back brace with work now.  Getting better.    OBJECTIVE:     Increased tension bilateral hamstring muscles    (PRIOR)  If left blank, assume NT:    Tingling in R foot after standing for a minute, sat down and did not get better  next time standing tingling in R foot less than in sitting, but then worse after standing a minute again     Myotomes/MMT's R L   Hip Flexion 5  4+    Knee Extension  5 4+ knee pain    Ankle Dorsiflexion 4  4+    EHL 4  5    Ankle Plantarflexion 5  5   PPT = G-                DTR          NE=No effect, C=centralize, A=abolish, p=peripheralize, P=produce, B=better, W=worse, D=decrease, I=increase, NW=no worse, NB=no better    "  Lumbar spine/trunk ROM S/S   FIS Min/mod decr Pulling R back   EIS Min decrease Mild increase on the way up   SG L     SG R Min decrease Moderate decrease             S/S During S/S After   Rep FIS       Rep EIS       Rep EIL variable variable   Rep MANPREET       Rep SG L       Rep SG R Decrease variable                Treatment:     TE's to incr strength, ROM, decr pain:    Supine:  PPT 5\" x 10  Iso hip add 5\" 2 x 10  BKFO'S 5\" X 5  B sh flexion and LTR  with Red T - ball 2 x 10  March 2 x 10 # 2 wt applied  SAQ # 2 wt applied 10 x 1 DNP    Standing:  with UE support for strengthening:  Hip abduction 2 x10 with 2 #  Hamstring curl 2 x10 with 2 #  Small squat 2 x10 dnp  Toe raise 2 x10  March 2 x 10 # 2    Seated LE exercise for strengthening   LAQ 2 x10 with 2 #  Sts 2 x 10  Hamstring stretch 3 x 20\" ea    Unattended electrical stimulation:  Interferential current  Parameters:  80 to 150 Hz level 24  Patient position:  Supine with wedge  Electrodes placed:  B LS area with HP  Comment:  skin check good    MANUAL THERAPY: STM B LS area R> L sitting leaning onto pillows      OP EDUCATION:    Access Code: [email or web address]  URL: [email or web address]  Date: 07/21/2025  Prepared by: Jayson Fernández    Exercises  - Standing Hip Abduction with Counter Support  - 1 x daily - 7 x weekly - 3 sets - 10 reps  - Standing Knee Flexion with Counter Support  - 1 x daily - 7 x weekly - 3 sets - 10 reps  - Standing March with Unilateral Counter Support  - 1 x daily - 7 x weekly - 3 sets - 10 reps  - Heel Toe Raises with Unilateral Counter Support  - 1 x daily - 7 x weekly - 3 sets - 10 reps  - Side Stepping with Unilateral Counter Support  - 1 x daily - 7 x weekly - 3 sets - 10 reps    Access Code: [email or web address]  URL: [email or web address]  Date: 07/14/2025  Prepared by: Jayson Fernández    Exercises  - Seated Ankle Dorsiflexion with Ankle Weight  - 1 x daily - 7 x weekly - 3 sets - 10 reps  - Heel Raises with Counter Support  - 1 x daily " "- 7 x weekly - 3 sets - 10 reps  Okay to stand with forefoot on block for gastroc stretch rather than runners stretch to decrease symptoms.    Prior:   Stop side glides and press ups, begin:  TrA  Ppt  Iso hip add  Hip abd with band    Assessment: Performed all ex well without increased discomfort , cont with HEP         Pt's response to treatment:  incr tolerance to te's  Areas of improvements:  Hep knowledge, pain  Limitations/deficits:  flexibility, pain, weakness    pt is s/p onset of right low back pain and radicular symptoms down right lower extremity and needs PT to incr ROM, strength, flexibility, improve posture and decr pain to restore function.     Pain end of session: 0      Continued PT required to reach all goals and restore function    Plan     Next visit continue core stab.     Skilled PT consisting of:  Aquatics, therapeutic exercise, therapeutic activity, NMR, manual, thermal, electric stimulation, US, light therapy, gait training, transfer training, dry needle.  Mechanical traction PRN and only if OK by PT, canalith repositioning  Rehab Potential: good  Frequency:  2x/wk  Duration: 20 weeks     Goals:     STG:   Pt to be I in initial HEP.  Pt to be I in posture correction.     LTG\"s:  Incr MMT of lower extremities and core by 1 grade for improved stability work.  Improve score on outcome form by 10 points to show incr in function.  Incr ROM of lumbar spine flexion and extension to WFL for less strain with bending  Decr max pain level to 2 for all activities including sleeping on her side.  Incr flexibility of hamstrings to within functional limits to allow improved trunk flexion with less strain on back       Some of This note was created using voice recognition software and was not corrected for typographical or grammatical errors.        "

## 2025-07-22 DIAGNOSIS — E11.69 TYPE 2 DIABETES MELLITUS WITH OTHER SPECIFIED COMPLICATION, WITH LONG-TERM CURRENT USE OF INSULIN: ICD-10-CM

## 2025-07-22 DIAGNOSIS — Z79.4 TYPE 2 DIABETES MELLITUS WITH OTHER SPECIFIED COMPLICATION, WITH LONG-TERM CURRENT USE OF INSULIN: ICD-10-CM

## 2025-07-22 RX ORDER — PIOGLITAZONE 30 MG/1
30 TABLET ORAL DAILY
Qty: 90 TABLET | Refills: 0 | Status: SHIPPED | OUTPATIENT
Start: 2025-07-22

## 2025-07-23 ENCOUNTER — TREATMENT (OUTPATIENT)
Dept: PHYSICAL THERAPY | Facility: CLINIC | Age: 60
End: 2025-07-23
Payer: MEDICARE

## 2025-07-23 DIAGNOSIS — M54.50 LOW BACK PAIN, UNSPECIFIED BACK PAIN LATERALITY, UNSPECIFIED CHRONICITY, UNSPECIFIED WHETHER SCIATICA PRESENT: ICD-10-CM

## 2025-07-23 DIAGNOSIS — M54.16 LUMBAR BACK PAIN WITH RADICULOPATHY AFFECTING RIGHT LOWER EXTREMITY: ICD-10-CM

## 2025-07-23 PROCEDURE — 97110 THERAPEUTIC EXERCISES: CPT | Mod: GP,CQ

## 2025-07-23 PROCEDURE — 97014 ELECTRIC STIMULATION THERAPY: CPT | Mod: GP,CQ

## 2025-07-23 PROCEDURE — 97140 MANUAL THERAPY 1/> REGIONS: CPT | Mod: GP,CQ

## 2025-07-23 NOTE — PROGRESS NOTES
Physical Therapy Treatment    Patient Name: Sandra Bradshaw  MRN: [id number]  Encounter date:  7/23/2025  Time Calculation  Start Time: 1055  Stop Time: 1135  Time Calculation (min): 40 min  PT Modalities Time Entry  E-Stim (Unattended) Time Entry: 10  PT Therapeutic Procedures Time Entry  Therapeutic Exercise Time Entry: 20  Manual Therapy Time Entry: 10    Visit Number:  8 (including evaluation)  Planned total visits: (including evaluation)  Visit Authorized:  11  EVAL ONLY-AUTH RCVD 10 VISITS APPROVED 6/12/25-8/15/25. CPT CODES 72336, 12817, 89369, 08133, 61751, 56095, 17272, 46385    2025: EVAL ONLY - CS [employer] - AUTH REQ / $9200 OOP not met / $50 COPAY / 20V/yr - 0 used / Per CS W/S, Availity [phone] / ds 6/3/25 //    Current Problem    Problem List Items Addressed This Visit           ICD-10-CM    Low back pain M54.50     Other Visit Diagnoses         Codes      Lumbar back pain with radiculopathy affecting right lower extremity     M54.16          Surgery:   History of remote neck surgery    Precautions   History of neck surgery and diabetes hypertension     Pain   0  Location lb, 2 OUTER CALF, AND no TIGHTNESS  Description sore  No t  N/tingle int R lat foot     Subjective:    Getting better, able to be more active without constant pain    OBJECTIVE:     Increased tension bilateral hamstring muscles    (PRIOR)  If left blank, assume NT:    Tingling in R foot after standing for a minute, sat down and did not get better  next time standing tingling in R foot less than in sitting, but then worse after standing a minute again     Myotomes/MMT's R L   Hip Flexion 5  4+    Knee Extension  5 4+ knee pain    Ankle Dorsiflexion 4  4+    EHL 4  5    Ankle Plantarflexion 5  5   PPT = G-                DTR          NE=No effect, C=centralize, A=abolish, p=peripheralize, P=produce, B=better, W=worse, D=decrease, I=increase, NW=no worse, NB=no better     Lumbar spine/trunk ROM S/S   FIS Min/mod decr  "Pulling R back   EIS Min decrease Mild increase on the way up   SG L     SG R Min decrease Moderate decrease             S/S During S/S After   Rep FIS       Rep EIS       Rep EIL variable variable   Rep MANPREET       Rep SG L       Rep SG R Decrease variable                Treatment:     TE's to incr strength, ROM, decr pain:    Supine:  PPT 5\" x 10  Iso hip add 5\" 2 x 10  BKFO'S 5\" X 5  B sh flexion and LTR  with Red T - ball 2 x 10  March 2 x 10 # 2 wt applied  SAQ # 2 wt applied 10 x 1 DNP    Standing:  with UE support for strengthening:  Hip abduction 2 x10 with 2 #  Hamstring curl 2 x10 with 2 #  Small squat 2 x10 dnp  Toe raise 2 x10  March 2 x 10 # 2    Seated LE exercise for strengthening   LAQ 2 x10 with 2 #  Sts 2 x 10  Hamstring stretch 3 x 20\" ea    Unattended electrical stimulation:  Interferential current  Parameters:  80 to 150 Hz level 24  Patient position:  Supine with wedge  Electrodes placed:  B LS area with HP  Comment:  skin check good    MANUAL THERAPY: STM B LS area R> L sitting leaning onto pillows      OP EDUCATION:    Access Code: [email or web address]  URL: [email or web address]  Date: 07/21/2025  Prepared by: Jayson Fernández    Exercises  - Standing Hip Abduction with Counter Support  - 1 x daily - 7 x weekly - 3 sets - 10 reps  - Standing Knee Flexion with Counter Support  - 1 x daily - 7 x weekly - 3 sets - 10 reps  - Standing March with Unilateral Counter Support  - 1 x daily - 7 x weekly - 3 sets - 10 reps  - Heel Toe Raises with Unilateral Counter Support  - 1 x daily - 7 x weekly - 3 sets - 10 reps  - Side Stepping with Unilateral Counter Support  - 1 x daily - 7 x weekly - 3 sets - 10 reps    Access Code: [email or web address]  URL: [email or web address]  Date: 07/14/2025  Prepared by: Jayson Fernández    Exercises  - Seated Ankle Dorsiflexion with Ankle Weight  - 1 x daily - 7 x weekly - 3 sets - 10 reps  - Heel Raises with Counter Support  - 1 x daily - 7 x weekly - 3 sets - 10 reps  Okay to stand " "with forefoot on block for gastroc stretch rather than runners stretch to decrease symptoms.    Prior:   Stop side glides and press ups, begin:  TrA  Ppt  Iso hip add  Hip abd with band    Assessment: Performed all ex well without increased discomfort , cont with HEP         Pt's response to treatment:  incr tolerance to te's  Areas of improvements:  Hep knowledge, pain  Limitations/deficits:  flexibility, pain, weakness    pt is s/p onset of right low back pain and radicular symptoms down right lower extremity and needs PT to incr ROM, strength, flexibility, improve posture and decr pain to restore function.     Pain end of session: 0      Continued PT required to reach all goals and restore function    Plan     Next visit continue core stab.     Skilled PT consisting of:  Aquatics, therapeutic exercise, therapeutic activity, NMR, manual, thermal, electric stimulation, US, light therapy, gait training, transfer training, dry needle.  Mechanical traction PRN and only if OK by PT, canalith repositioning  Rehab Potential: good  Frequency:  2x/wk  Duration: 20 weeks     Goals:     STG:   Pt to be I in initial HEP.  Pt to be I in posture correction.     LTG\"s:  Incr MMT of lower extremities and core by 1 grade for improved stability work.  Improve score on outcome form by 10 points to show incr in function.  Incr ROM of lumbar spine flexion and extension to WFL for less strain with bending  Decr max pain level to 2 for all activities including sleeping on her side.  Incr flexibility of hamstrings to within functional limits to allow improved trunk flexion with less strain on back       Some of This note was created using voice recognition software and was not corrected for typographical or grammatical errors.        "

## 2025-07-28 ENCOUNTER — TREATMENT (OUTPATIENT)
Dept: PHYSICAL THERAPY | Facility: CLINIC | Age: 60
End: 2025-07-28
Payer: MEDICARE

## 2025-07-28 DIAGNOSIS — M54.50 LOW BACK PAIN, UNSPECIFIED BACK PAIN LATERALITY, UNSPECIFIED CHRONICITY, UNSPECIFIED WHETHER SCIATICA PRESENT: ICD-10-CM

## 2025-07-28 DIAGNOSIS — M54.16 LUMBAR BACK PAIN WITH RADICULOPATHY AFFECTING RIGHT LOWER EXTREMITY: ICD-10-CM

## 2025-07-28 PROCEDURE — 97110 THERAPEUTIC EXERCISES: CPT | Mod: GP | Performed by: PHYSICAL THERAPIST

## 2025-07-28 PROCEDURE — 97014 ELECTRIC STIMULATION THERAPY: CPT | Mod: GP | Performed by: PHYSICAL THERAPIST

## 2025-07-28 NOTE — PROGRESS NOTES
Physical Therapy Treatment    Patient Name: Sandra Bradshaw  MRN: [id number]  Encounter date:  7/28/2025  Time Calculation  Start Time: 1300  Stop Time: 1340  Time Calculation (min): 40 min  PT Modalities Time Entry  E-Stim (Unattended) Time Entry: 10  PT Therapeutic Procedures Time Entry  Therapeutic Exercise Time Entry: 24  Manual Therapy Time Entry: 6    Visit Number:  9 (including evaluation)  Planned total visits: (including evaluation)  Visit Authorized:  11  EVAL ONLY-AUTH RCVD 10 VISITS APPROVED 6/12/25-8/15/25. CPT CODES 13144, 56447, 42334, 07488, 95445, 33731, 80294, 95547    2025: EVAL ONLY - CS [employer] - AUTH REQ / $9200 OOP not met / $50 COPAY / 20V/yr - 0 used / Per CS W/S, Availity [id number] / ds 6/3/25 //  Re-eval in 2 visits      Current Problem    Problem List Items Addressed This Visit           ICD-10-CM    Low back pain M54.50     Other Visit Diagnoses         Codes      Lumbar back pain with radiculopathy affecting right lower extremity     M54.16          Surgery:   History of remote neck surgery    Precautions   History of neck surgery and diabetes hypertension     Pain   0  Location lb,  outer calf  Description sore  No t  N/tingle int R lat foot     Subjective:    Getting better, able to be more active without constant pain    OBJECTIVE:     Increased tension bilateral L paraspinal    (PRIOR)  If left blank, assume NT:    Tingling in R foot after standing for a minute, sat down and did not get better  next time standing tingling in R foot less than in sitting, but then worse after standing a minute again     Myotomes/MMT's R L   Hip Flexion 5  4+    Knee Extension  5 4+ knee pain    Ankle Dorsiflexion 4  4+    EHL 4  5    Ankle Plantarflexion 5  5   PPT = G-                DTR          NE=No effect, C=centralize, A=abolish, p=peripheralize, P=produce, B=better, W=worse, D=decrease, I=increase, NW=no worse, NB=no better     Lumbar spine/trunk ROM S/S   FIS Min/mod decr  "Pulling R back   EIS Min decrease Mild increase on the way up   SG L     SG R Min decrease Moderate decrease             S/S During S/S After   Rep FIS       Rep EIS       Rep EIL variable variable   Rep MANPREET       Rep SG L       Rep SG R Decrease variable                Treatment:     TE's to incr strength, ROM, decr pain:    Supine:  PPT 5\" x 10  Iso hip add 5\" 2 x 10  BKFO'S 5\" X 5 DNP  B sh flexion and LTR  with Red T - ball 2 x 10  March 2 x 10 # 2 wt applied  SAQ # 2 wt applied 10 x 1 DNP    Standing:  with UE support for strengthening:  Hip abduction 2 x10 with 2 #  Hamstring curl 2 x10 with 2 #  Small squat 2 x10   Toe raise 2 x10  March 2 x 10 # 2  Hip ext 2# 2 x 10  Mini squat 2 x 10    Seated LE exercise for strengthening   LAQ 2 x10 with 2 #  STS 2 x 10  Hamstring stretch 3 x 20\" ea DNP    Rec bike lv 3, 3'    Unattended electrical stimulation:  Interferential current  Parameters:  80 to 150 Hz level 25  Patient position:  Supine with wedge  Electrodes placed:  B LS area with HP  Comment:  mild incr redness sacral region    MANUAL THERAPY: STM B LS area R> L sitting leaning onto pillows      OP EDUCATION:    Access Code: [email or web address]  URL: [email or web address]  Date: 07/21/2025  Prepared by: Jayson Fernández    Exercises  - Standing Hip Abduction with Counter Support  - 1 x daily - 7 x weekly - 3 sets - 10 reps  - Standing Knee Flexion with Counter Support  - 1 x daily - 7 x weekly - 3 sets - 10 reps  - Standing March with Unilateral Counter Support  - 1 x daily - 7 x weekly - 3 sets - 10 reps  - Heel Toe Raises with Unilateral Counter Support  - 1 x daily - 7 x weekly - 3 sets - 10 reps  - Side Stepping with Unilateral Counter Support  - 1 x daily - 7 x weekly - 3 sets - 10 reps    Access Code: [email or web address]  URL: [email or web address]  Date: 07/14/2025  Prepared by: Jayson Fernández    Exercises  - Seated Ankle Dorsiflexion with Ankle Weight  - 1 x daily - 7 x weekly - 3 sets - 10 reps  - Heel Raises " "with Counter Support  - 1 x daily - 7 x weekly - 3 sets - 10 reps  Okay to stand with forefoot on block for gastroc stretch rather than runners stretch to decrease symptoms.    Prior:   Stop side glides and press ups, begin:  TrA  Ppt  Iso hip add  Hip abd with band    Assessment: Performed all ex well without increased discomfort , cont with HEP         Pt's response to treatment:  incr tolerance to te's  Areas of improvements:  Hep knowledge, pain, activity tolerance  Limitations/deficits:  flexibility, pain, weakness    pt is s/p onset of right low back pain and radicular symptoms down right lower extremity and needs PT to incr ROM, strength, flexibility, improve posture and decr pain to restore function.     Pain end of session: no reported pain      Continued PT required to reach all goals and restore function    Plan:  re-eval in 2 visits for potential discharge to updated HEP-sciatic nn glide, HS stretch, TrA, etc.     Next visit continue core stab.     Skilled PT consisting of:  Aquatics, therapeutic exercise, therapeutic activity, NMR, manual, thermal, electric stimulation, US, light therapy, gait training, transfer training, dry needle.  Mechanical traction PRN and only if OK by PT, canalith repositioning  Rehab Potential: good  Frequency:  2x/wk  Duration: 20 weeks     Goals:     STG:   Pt to be I in initial HEP.  Pt to be I in posture correction.     LTG\"s:  Incr MMT of lower extremities and core by 1 grade for improved stability work.  Improve score on outcome form by 10 points to show incr in function.  Incr ROM of lumbar spine flexion and extension to WFL for less strain with bending  Decr max pain level to 2 for all activities including sleeping on her side.  Incr flexibility of hamstrings to within functional limits to allow improved trunk flexion with less strain on back       Some of This note was created using voice recognition software and was not corrected for typographical or grammatical " errors.

## 2025-07-31 ENCOUNTER — TREATMENT (OUTPATIENT)
Dept: PHYSICAL THERAPY | Facility: CLINIC | Age: 60
End: 2025-07-31
Payer: MEDICARE

## 2025-07-31 DIAGNOSIS — M54.50 LOW BACK PAIN, UNSPECIFIED BACK PAIN LATERALITY, UNSPECIFIED CHRONICITY, UNSPECIFIED WHETHER SCIATICA PRESENT: ICD-10-CM

## 2025-07-31 DIAGNOSIS — M54.16 LUMBAR BACK PAIN WITH RADICULOPATHY AFFECTING RIGHT LOWER EXTREMITY: ICD-10-CM

## 2025-07-31 PROCEDURE — 97140 MANUAL THERAPY 1/> REGIONS: CPT | Mod: GP,CQ

## 2025-07-31 PROCEDURE — 97014 ELECTRIC STIMULATION THERAPY: CPT | Mod: GP,CQ

## 2025-07-31 PROCEDURE — 97110 THERAPEUTIC EXERCISES: CPT | Mod: GP,CQ

## 2025-07-31 NOTE — PROGRESS NOTES
Physical Therapy Treatment    Patient Name: Sandra Bradshaw  MRN: [id number]  Encounter date:  7/31/2025  Time Calculation  Start Time: 1010  Stop Time: 1050  Time Calculation (min): 40 min  PT Modalities Time Entry  E-Stim (Unattended) Time Entry: 10  PT Therapeutic Procedures Time Entry  Therapeutic Exercise Time Entry: 20  Manual Therapy Time Entry: 10    Visit Number:  10 (including evaluation)  Planned total visits: (including evaluation)  Visit Authorized:  11  EVAL ONLY-AUTH RCVD 10 VISITS APPROVED 6/12/25-8/15/25. CPT CODES 92876, 84321, 30055, 70903, 58171, 41999, 49382, 92019    2025: EVAL ONLY - CS [employer]500 - AUTH REQ / $9200 OOP not met / $50 COPAY / 20V/yr - 0 used / Per CS W/S, Availity [id number] / ds 6/3/25 //  Re-eval in 2 visits      Current Problem    Problem List Items Addressed This Visit           ICD-10-CM    Low back pain M54.50     Other Visit Diagnoses         Codes      Lumbar back pain with radiculopathy affecting right lower extremity     M54.16          Surgery:   History of remote neck surgery    Precautions   History of neck surgery and diabetes hypertension     Pain   0  Location lb,  outer calf  Description sore  No t  N/tingle int R lat foot     Subjective:    Getting better, able to be more active without constant pain, overall discomfort is decreasing    OBJECTIVE:     Increased tension bilateral L paraspinal    (PRIOR)  If left blank, assume NT:    Tingling in R foot after standing for a minute, sat down and did not get better  next time standing tingling in R foot less than in sitting, but then worse after standing a minute again     Myotomes/MMT's R L   Hip Flexion 5  4+    Knee Extension  5 4+ knee pain    Ankle Dorsiflexion 4  4+    EHL 4  5    Ankle Plantarflexion 5  5   PPT = G-                DTR          NE=No effect, C=centralize, A=abolish, p=peripheralize, P=produce, B=better, W=worse, D=decrease, I=increase, NW=no worse, NB=no better     Lumbar  "spine/trunk ROM S/S   FIS Min/mod decr Pulling R back   EIS Min decrease Mild increase on the way up   SG L     SG R Min decrease Moderate decrease             S/S During S/S After   Rep FIS       Rep EIS       Rep EIL variable variable   Rep MANPREET       Rep SG L       Rep SG R Decrease variable                Treatment:     TE's to incr strength, ROM, decr pain:    Supine:  PPT 5\" x 10  Iso hip add 5\" 2 x 10  BKFO'S 5\" X 5 DNP  B Trunk flexion and LTR  with Red T - ball 2 x 10  March 2 x 10 # 2 wt applied  SAQ # 2 wt applied 10 x 1 DNP    Standing:  with UE support for strengthening:  Hip abduction 2 x10 with 2 #  Hamstring curl 2 x10 with 2 #  Small squat 2 x10   Toe raise 2 x10  March 2 x 10 # 2  Hip ext 2# 2 x 10  Mini squat 2 x 10  Side stepping  Kick stands  Step up/ down on 4 inch block    Seated LE exercise for strengthening   LAQ 2 x10 with 2 #  STS 2 x 10    Unattended electrical stimulation:  Interferential current  Parameters:  80 to 150 Hz level 25  Patient position:  Supine with wedge  Electrodes placed:  B LS area with HP  Comment:  mild incr redness sacral region    MANUAL THERAPY: STM B LS area R> L sitting leaning onto pillows      OP EDUCATION:    Access Code: [email or web address]  URL: [email or web address]  Date: 07/21/2025  Prepared by: Jayson Fernández    Exercises  - Standing Hip Abduction with Counter Support  - 1 x daily - 7 x weekly - 3 sets - 10 reps  - Standing Knee Flexion with Counter Support  - 1 x daily - 7 x weekly - 3 sets - 10 reps  - Standing March with Unilateral Counter Support  - 1 x daily - 7 x weekly - 3 sets - 10 reps  - Heel Toe Raises with Unilateral Counter Support  - 1 x daily - 7 x weekly - 3 sets - 10 reps  - Side Stepping with Unilateral Counter Support  - 1 x daily - 7 x weekly - 3 sets - 10 reps    Access Code: [email or web address]  URL: [email or web address]  Date: 07/14/2025  Prepared by: Jayson Fernández    Exercises  - Seated Ankle Dorsiflexion with Ankle Weight  - 1 x daily - 7 " "x weekly - 3 sets - 10 reps  - Heel Raises with Counter Support  - 1 x daily - 7 x weekly - 3 sets - 10 reps  Okay to stand with forefoot on block for gastroc stretch rather than runners stretch to decrease symptoms.    Prior:   Stop side glides and press ups, begin:  TrA  Ppt  Iso hip add  Hip abd with band    Assessment: Performed all ex well without increased discomfort , cont with HEP         Pt's response to treatment:  incr tolerance to te's  Areas of improvements:  Hep knowledge, pain, activity tolerance  Limitations/deficits:  flexibility, pain, weakness    pt is s/p onset of right low back pain and radicular symptoms down right lower extremity and needs PT to incr ROM, strength, flexibility, improve posture and decr pain to restore function.     Pain end of session: no reported pain      Continued PT required to reach all goals and restore function    Plan:  re-eval in 2 visits for potential discharge to updated HEP-sciatic nn glide, HS stretch, TrA, etc.     Next visit continue core stab.     Skilled PT consisting of:  Aquatics, therapeutic exercise, therapeutic activity, NMR, manual, thermal, electric stimulation, US, light therapy, gait training, transfer training, dry needle.  Mechanical traction PRN and only if OK by PT, canalith repositioning  Rehab Potential: good  Frequency:  2x/wk  Duration: 20 weeks     Goals:     STG:   Pt to be I in initial HEP.  Pt to be I in posture correction.     LTG\"s:  Incr MMT of lower extremities and core by 1 grade for improved stability work.  Improve score on outcome form by 10 points to show incr in function.  Incr ROM of lumbar spine flexion and extension to WFL for less strain with bending  Decr max pain level to 2 for all activities including sleeping on her side.  Incr flexibility of hamstrings to within functional limits to allow improved trunk flexion with less strain on back       Some of This note was created using voice recognition software and was not " corrected for typographical or grammatical errors.

## 2025-08-04 ENCOUNTER — TREATMENT (OUTPATIENT)
Dept: PHYSICAL THERAPY | Facility: CLINIC | Age: 60
End: 2025-08-04
Payer: MEDICARE

## 2025-08-04 DIAGNOSIS — M54.16 LUMBAR BACK PAIN WITH RADICULOPATHY AFFECTING RIGHT LOWER EXTREMITY: ICD-10-CM

## 2025-08-04 DIAGNOSIS — M54.50 LOW BACK PAIN, UNSPECIFIED BACK PAIN LATERALITY, UNSPECIFIED CHRONICITY, UNSPECIFIED WHETHER SCIATICA PRESENT: ICD-10-CM

## 2025-08-04 PROCEDURE — 97110 THERAPEUTIC EXERCISES: CPT | Mod: GP | Performed by: PHYSICAL THERAPIST

## 2025-08-04 NOTE — PROGRESS NOTES
"Physical Therapy Treatment, progress report and discharge summary    Patient Name: Sandra Bradshaw  MRN: [id number]  Encounter date:  8/4/2025  Time Calculation  Start Time: 1604  Stop Time: 1642  Time Calculation (min): 38 min     PT Therapeutic Procedures Time Entry  Therapeutic Exercise Time Entry: 38    Visit Number:  11 (including evaluation)  Planned total visits: (including evaluation)  Visit Authorized:  11  EVAL ONLY-AUTH RCVD 10 VISITS APPROVED 6/12/25-8/15/25. CPT CODES 88535, 34796, 08405, 05093, 78336, 46071, 19002, 77798    2025: EVAL ONLY - CS [employer] - AUTH REQ / $9200 OOP not met / $50 COPAY / 20V/yr - 0 used / Per CS W/S, Availity [phone] / ds 6/3/25 //      Current Problem    Problem List Items Addressed This Visit           ICD-10-CM    Low back pain M54.50     Other Visit Diagnoses         Codes      Lumbar back pain with radiculopathy affecting right lower extremity     M54.16          Surgery:   History of remote neck surgery    Precautions   History of neck surgery and diabetes hypertension     Pain   1-2  Location lb,  outer calf  Description sore    Subjective:    doing well with HEP.  Max pain in last week 5 with prolonged standing.    OBJECTIVE: If left blank, assume NT:      Myotomes/MMT's R L   Hip Flexion 5  4+    Knee Extension  5 5   Ankle Dorsiflexion 5 5   EHL 4+ 5    Ankle Plantarflexion 5  5   PPT = G               DTR            Outcome measures:  11/50    ROM:  Trunk ROM flexion min decr, ext wfl    Hamstring flexibility:  R - 25  L -30    Treatment:     TE's to incr strength, ROM, decr pain:     Bike lv 3 5'    Supine:  PPT 5\" x 10  Iso hip add 5\" 2 x 10  SLR with PPT 2 x 10  Bridge 5\" x 10  B sh flexion with ball 2 x 10  Hip abd with blue TB 2 x 10    Standing:  with UE support for strengthening:  Hip abduction x10   Hamstring curl x10   Toe raise  x10  March 2 x 10   Hip ext x 10      Seated LE exercise for strengthening and stretching:  Hamstring " "stretches 3 x 30\" ea  STS 2 x 10      OP EDUCATION:  Add hs stretch to HEP, stay active.  Ok for a few reps standing back bends against high counter  Prior issued  Access Code: [email or web address]  URL: [email or web address]  Date: 07/21/2025  Prepared by: Jayson Fernández    Exercises  - Standing Hip Abduction with Counter Support  - 1 x daily - 7 x weekly - 3 sets - 10 reps  - Standing Knee Flexion with Counter Support  - 1 x daily - 7 x weekly - 3 sets - 10 reps  - Standing March with Unilateral Counter Support  - 1 x daily - 7 x weekly - 3 sets - 10 reps  - Heel Toe Raises with Unilateral Counter Support  - 1 x daily - 7 x weekly - 3 sets - 10 reps  - Side Stepping with Unilateral Counter Support  - 1 x daily - 7 x weekly - 3 sets - 10 reps    Access Code: [email or web address]  URL: [email or web address]  Date: 07/14/2025  Prepared by: Jayson Fernández    Exercises  - Seated Ankle Dorsiflexion with Ankle Weight  - 1 x daily - 7 x weekly - 3 sets - 10 reps  - Heel Raises with Counter Support  - 1 x daily - 7 x weekly - 3 sets - 10 reps  Okay to stand with forefoot on block for gastroc stretch rather than runners stretch to decrease symptoms.    Prior:   Stop side glides and press ups, begin:  TrA  Ppt  Iso hip add  Hip abd with band    Assessment: Pt is progressing or met goals.  PT recommendation is to discharge to [facility]  Pt agrees.  I in final HEP after instruction today      pt is s/p onset of right low back pain and radicular symptoms down right lower extremity and needs PT to incr ROM, strength, flexibility, improve posture and decr pain to restore function.     Pain end of session: no reported change    Plan: discharge to [facility]    Goals:     STG:   Pt to be I in initial HEP.-goal met  Pt to be I in posture correction.  Goal met     LTG\"s:  Incr MMT of lower extremities and core by 1 grade for improved stability work.  Goal met  Improve score on outcome form by 10 points to show incr in function.  Goal met  Incr ROM of lumbar " spine flexion and extension to WFL for less strain with bending progressing  Decr max pain level to 2 for all activities including sleeping on her side. progressing  Incr flexibility of hamstrings to within functional limits to allow improved trunk flexion with less strain on back - goal met       Some of This note was created using voice recognition software and was not corrected for typographical or grammatical errors.

## 2025-08-09 PROCEDURE — RXMED WILLOW AMBULATORY MEDICATION CHARGE

## 2025-08-12 ENCOUNTER — PHARMACY VISIT (OUTPATIENT)
Dept: PHARMACY | Facility: CLINIC | Age: 60
End: 2025-08-12
Payer: COMMERCIAL

## 2025-09-17 ENCOUNTER — APPOINTMENT (OUTPATIENT)
Dept: ENDOCRINOLOGY | Facility: CLINIC | Age: 60
End: 2025-09-17
Payer: MEDICARE